# Patient Record
Sex: FEMALE | Race: WHITE | NOT HISPANIC OR LATINO | Employment: UNEMPLOYED | URBAN - NONMETROPOLITAN AREA
[De-identification: names, ages, dates, MRNs, and addresses within clinical notes are randomized per-mention and may not be internally consistent; named-entity substitution may affect disease eponyms.]

---

## 2017-06-03 ENCOUNTER — HOSPITAL ENCOUNTER (EMERGENCY)
Facility: HOSPITAL | Age: 57
Discharge: HOME | End: 2017-06-03
Attending: PHYSICIAN ASSISTANT | Admitting: PHYSICIAN ASSISTANT
Payer: COMMERCIAL

## 2017-06-03 VITALS
HEART RATE: 110 BPM | SYSTOLIC BLOOD PRESSURE: 157 MMHG | DIASTOLIC BLOOD PRESSURE: 84 MMHG | OXYGEN SATURATION: 98 % | TEMPERATURE: 97.8 F | RESPIRATION RATE: 16 BRPM

## 2017-06-03 DIAGNOSIS — J20.9 ACUTE BRONCHITIS, UNSPECIFIED ORGANISM: ICD-10-CM

## 2017-06-03 DIAGNOSIS — R11.0 NAUSEA: ICD-10-CM

## 2017-06-03 PROCEDURE — 99214 OFFICE O/P EST MOD 30 MIN: CPT | Performed by: PHYSICIAN ASSISTANT

## 2017-06-03 PROCEDURE — 99213 OFFICE O/P EST LOW 20 MIN: CPT | Mod: 25

## 2017-06-03 PROCEDURE — 94640 AIRWAY INHALATION TREATMENT: CPT

## 2017-06-03 PROCEDURE — 71020 ZZHC CHEST TWO VIEWS, FRONT/LAT: CPT | Mod: TC

## 2017-06-03 PROCEDURE — 25000125 ZZHC RX 250: Performed by: PHYSICIAN ASSISTANT

## 2017-06-03 RX ORDER — ALBUTEROL SULFATE 0.83 MG/ML
1 SOLUTION RESPIRATORY (INHALATION) EVERY 6 HOURS PRN
Qty: 75 ML | Refills: 0 | Status: SHIPPED | OUTPATIENT
Start: 2017-06-03 | End: 2017-08-19

## 2017-06-03 RX ORDER — ONDANSETRON 4 MG/1
4 TABLET, ORALLY DISINTEGRATING ORAL ONCE
Status: COMPLETED | OUTPATIENT
Start: 2017-06-03 | End: 2017-06-03

## 2017-06-03 RX ORDER — IPRATROPIUM BROMIDE AND ALBUTEROL SULFATE 2.5; .5 MG/3ML; MG/3ML
3 SOLUTION RESPIRATORY (INHALATION) ONCE
Status: COMPLETED | OUTPATIENT
Start: 2017-06-03 | End: 2017-06-03

## 2017-06-03 RX ORDER — ONDANSETRON 4 MG/1
4 TABLET, ORALLY DISINTEGRATING ORAL EVERY 8 HOURS PRN
Qty: 10 TABLET | Refills: 0 | Status: SHIPPED | OUTPATIENT
Start: 2017-06-03 | End: 2017-06-06

## 2017-06-03 RX ORDER — AZITHROMYCIN 250 MG/1
TABLET, FILM COATED ORAL
Qty: 6 TABLET | Refills: 0 | Status: SHIPPED | OUTPATIENT
Start: 2017-06-03 | End: 2017-08-19

## 2017-06-03 RX ORDER — GUAIFENESIN AND PSEUDOEPHEDRINE HCL 1200; 120 MG/1; MG/1
1 TABLET, EXTENDED RELEASE ORAL 2 TIMES DAILY
Qty: 28 TABLET | Refills: 0 | Status: SHIPPED | OUTPATIENT
Start: 2017-06-03 | End: 2017-08-19

## 2017-06-03 RX ORDER — ALBUTEROL SULFATE 90 UG/1
2 AEROSOL, METERED RESPIRATORY (INHALATION) EVERY 4 HOURS PRN
Qty: 1 INHALER | Refills: 0 | Status: SHIPPED | OUTPATIENT
Start: 2017-06-03 | End: 2020-10-03

## 2017-06-03 RX ADMIN — ONDANSETRON 4 MG: 4 TABLET, ORALLY DISINTEGRATING ORAL at 11:06

## 2017-06-03 RX ADMIN — IPRATROPIUM BROMIDE AND ALBUTEROL SULFATE 3 ML: .5; 3 SOLUTION RESPIRATORY (INHALATION) at 10:52

## 2017-06-03 ASSESSMENT — ENCOUNTER SYMPTOMS
NECK STIFFNESS: 0
HEADACHES: 0
CARDIOVASCULAR NEGATIVE: 1
LIGHT-HEADEDNESS: 0
EYE REDNESS: 0
FATIGUE: 1
FEVER: 0
NECK PAIN: 0
PSYCHIATRIC NEGATIVE: 1
VOMITING: 0
SORE THROAT: 0
NAUSEA: 1
TROUBLE SWALLOWING: 0
DIAPHORESIS: 1
DIZZINESS: 0
DIARRHEA: 0
VOICE CHANGE: 0
ABDOMINAL PAIN: 0
COUGH: 1
SINUS PRESSURE: 0
APPETITE CHANGE: 0
EYE DISCHARGE: 0

## 2017-06-03 NOTE — ED AVS SNAPSHOT
[facility]    [address] [address]    Phone:  [phone]                                       Nevin Eckert   MRN: [id number]    Department:  [facility]   Date of Visit:  6/3/2017           Patient Information     Date Of Birth          1960        Your diagnoses for this visit were:     Acute bronchitis, unspecified organism     Nausea        You were seen by Jennifer West PA.      Follow-up Information     Follow up with [facility].    Specialty:  EMERGENCY MEDICINE    Why:  If symptoms worsen or if further concerns develop    Contact information:    [address] [address] [phone]    Additional information:    From [facility]: Take [address]. Turn left at [address]. Turn left at the first stoplight on [address]. At the first stop sign, take a right onto [address]. Take a left into the parking lot and continue through until you reach the North enterance of the building.       From [place]: Take [address]. Take the [address] ramp towards [place]. Turn left onto [address]. Take a slight right onto [address]. Turn left at the first stoplight on [address]. At the first stop sign, take a right onto [address]. Take a left into the parking lot and continue through until you reach the North enterance of the building.       From [place]: Take [address]. Take a right at [address]. At the first stop sign, take a right onto [address]. Take a left into the parking lot and continue through until you reach the North enterance of the building.       Discharge References/Attachments     BRONCHITIS, ANTIOBIOTIC TREATMENT (ADULT) (ENGLISH)    DIET, VOMITING OR DIARRHEA (ADULT) (ENGLISH)         Review of your medicines      START taking        Dose / Directions Last dose taken    albuterol 108 (90 BASE) MCG/ACT Inhaler    Commonly known as:  albuterol   Dose:  2 puff   Quantity:  1 Inhaler        Inhale 2 puffs into the lungs every 4 hours as needed for shortness of breath / dyspnea   Refills:  0        azithromycin 250 MG tablet   Commonly known as:  ZITHROMAX   Quantity:  6 tablet        Take 2 tablets today then one daily for the next 4 days   Refills:  0        ondansetron 4 MG ODT tab   Commonly known as:  ZOFRAN ODT   Dose:  4 mg   Quantity:  10 tablet        Take 1 tablet (4 mg) by mouth every 8 hours as needed for nausea   Refills:  0        PseudoePHEDrine-guaiFENesin 120-1200 MG Tb12   Dose:  1 tablet   Quantity:  28 tablet        Take 1 tablet by mouth 2 times daily   Refills:  0          Our records show that you are taking the medicines listed below. If these are incorrect, please call your family doctor or clinic.        Dose / Directions Last dose taken    * ACCU-CHEK COMPLETE Kit   Dose:  1 Device   Quantity:  60 strip        1 Device 2 times daily   Refills:  12        * blood glucose monitoring meter device kit   Commonly known as:  no brand specified   Quantity:  1 kit        Use to test blood sugar 2 times daily or as directed.   Refills:  0        aspirin 81 MG tablet        Take  by mouth daily.   Refills:  0        blood glucose lancets standard   Commonly known as:  no brand specified   Quantity:  1 Box        Use to test blood sugar 2 times daily or as directed.   Refills:  5        blood glucose monitoring test strip   Commonly known as:  no brand specified   Quantity:  100 each        Use to test blood sugars 2 times daily or as directed   Refills:  5        GLUCOPHAGE XR PO   Dose:  500 mg   Indication:  Type 2 Diabetes        Take 500 mg by mouth daily (with breakfast)   Refills:  0        lisinopril 2.5 MG tablet   Commonly known as:  PRINIVIL/Zestril   Dose:  2.5 mg   Quantity:  30 tablet        Take 1 tablet (2.5 mg) by mouth daily   Refills:  12        oxyCODONE 5 MG IR tablet   Commonly known as:   "ROXICODONE   Dose:  5 mg        Take 5 mg by mouth   Refills:  0        * Notice:  This list has 2 medication(s) that are the same as other medications prescribed for you. Read the directions carefully, and ask your doctor or other care provider to review them with you.            Prescriptions were sent or printed at these locations (4 Prescriptions)                   [facility] - [place], [place] - [address] AT [facility] [address]   [address], [place] [address]    Telephone:  [phone]   Fax:  [phone]   Hours:                  E-Prescribed (3 of 4)         azithromycin (ZITHROMAX) 250 MG tablet               albuterol (ALBUTEROL) 108 (90 BASE) MCG/ACT Inhaler               ondansetron (ZOFRAN ODT) 4 MG ODT tab                 Printed at Department/Unit printer (1 of 4)         PseudoePHEDrine-guaiFENesin 120-1200 MG TB12                Procedures and tests performed during your visit     Chest XR,  PA & LAT      Orders Needing Specimen Collection     None      Pending Results     Date and Time Order Name Status Description    6/3/2017 1022 Chest XR,  PA & LAT In process             Pending Culture Results     No orders found from 6/1/2017 to 6/4/2017.            Thank you for choosing [facility]       Thank you for choosing [facility] for your care. Our goal is always to provide you with excellent care. Hearing back from our patients is one way we can continue to improve our services. Please take a few minutes to complete the written survey that you may receive in the mail after you visit with us. Thank you!        [facility] Information     [facility] lets you send messages to your doctor, view your test results, renew your prescriptions, schedule appointments and more. To sign up, go to [email or web address] . Click on \"Log in\" on the left side of the screen, which will take you to the Welcome page. Then click on \"Sign up Now\" on the right side of the page.     You will be asked to " enter the access code listed below, as well as some personal information. Please follow the directions to create your username and password.     Your access code is: [email or web address]  Expires: 2017 10:57 AM     Your access code will  in 90 days. If you need help or a new code, please call your [place] clinic or [phone].        Care EveryWhere ID     This is your Care EveryWhere ID. This could be used by other organizations to access your [facility] medical records  [id number]        After Visit Summary       This is your record. Keep this with you and show to your community pharmacist(s) and doctor(s) at your next visit.

## 2017-06-03 NOTE — ED AVS SNAPSHOT
[facility]    [address] [address]    Phone:  [phone]                                       Nevin Eckert   MRN: [id number]    Department:  [facility]   Date of Visit:  6/3/2017           After Visit Summary Signature Page     I have received my discharge instructions, and my questions have been answered. I have discussed any challenges I see with this plan with the nurse or doctor.    ..........................................................................................................................................  Patient/Patient Representative Signature      ..........................................................................................................................................  Patient Representative Print Name and Relationship to Patient    ..................................................               ................................................  Date                                            Time    ..........................................................................................................................................  Reviewed by Signature/Title    ...................................................              ..............................................  Date                                                            Time

## 2017-06-03 NOTE — ED NOTES
Ambulated into UC in rm 3. C/O pt states symptoms started about a week ago and has progressively getting worse with the cough and choking on the yellow phelm. She is feverish and when she takes tylenol she gets pain in her pancrease, pain rating a 5. Excertion makes the condition worse.

## 2017-06-03 NOTE — ED PROVIDER NOTES
History     Chief Complaint   Patient presents with     Cough     states shes had for a week and coughing up yellow sputum.  states her family has all had the same thing and dx with viral but thinks she needs antibiotics because of the yellow sputum     Generalized Body Aches     shes had for a week also very tired for a week     The history is provided by the patient. No  was used.     Nevin Eckert is a 57 year old female who has almost 2 weeks of cough/congestion, fatigue, and nausea. Pt sweaty.  Has diffuse body aches    Allergies as of 06/03/2017 - [place] as Reviewed 06/03/2017   Allergen Reaction Noted     Codeine Other (See Comments) and Itching 05/03/2013     Morphine Other (See Comments) 05/03/2013     Penicillins Other (See Comments) and Rash 05/03/2013     Discharge Medication List as of 6/3/2017 10:58 AM      START taking these medications    Details   azithromycin (ZITHROMAX) 250 MG tablet Take 2 tablets today then one daily for the next 4 days, Disp-6 tablet, R-0, E-Prescribe      PseudoePHEDrine-guaiFENesin 120-1200 MG TB12 Take 1 tablet by mouth 2 times daily, Disp-28 tablet, R-0, Local Print      albuterol (ALBUTEROL) 108 (90 BASE) MCG/ACT Inhaler Inhale 2 puffs into the lungs every 4 hours as needed for shortness of breath / dyspnea, Disp-1 Inhaler, R-0, E-Prescribe      ondansetron (ZOFRAN ODT) 4 MG ODT tab Take 1 tablet (4 mg) by mouth every 8 hours as needed for nausea, Disp-10 tablet, R-0, E-Prescribe         CONTINUE these medications which have NOT CHANGED    Details   blood glucose monitoring (NO BRAND SPECIFIED) meter device kit Use to test blood sugar 2 times daily or as directed.Disp-1 kit, [id number] dispense Accu-Check      blood glucose monitoring (NO BRAND SPECIFIED) test strip Use to test blood sugars 2 times daily or as directedDisp-100 each, [id number] dispense Accu-Check      blood glucose (NO BRAND SPECIFIED) lancets standard Use to test  blood sugar 2 times daily or as directed.Disp-1 Box, [id number] dispense Accu-Check.      MetFORMIN HCl (GLUCOPHAGE XR PO) Take 500 mg by mouth daily (with breakfast), Historical      Blood Glucose Monitoring Suppl (ACCU-CHEK COMPLETE) KIT 1 Device 2 times daily, Disp-60 strip, R-12, E-Prescribe      lisinopril (PRINIVIL,ZESTRIL) 2.5 MG tablet Take 1 tablet (2.5 mg) by mouth daily, Disp-30 tablet, R-12, E-Prescribe      aspirin 81 MG tablet Take  by mouth daily., Historical      oxyCODONE (ROXICODONE) 5 MG immediate release tablet Take 5 mg by mouth, Historical           Past Medical History:   Diagnosis Date     Absence of menstruation 1/15/2001     Acute sinusitis, unspecified 1/3/2004     Acute upper respiratory infections of unspecified site 11/17/2000     Bronchitis, not specified as acute or chronic 11/20/2000     Care involving other physical therapy 4/17/2000     Chest pain, unspecified 10/12/2000     Diabetes mellitus without mention of complication,  2/4/2003     Migraine, unspecified, without mention of intractable migraine without mention of status migrainosus 12/5/2002     Seronegative rheumatoid arthritis (H) 02/24/2016    Dr Hooker     Unspecified sinusitis (chronic) 3/28/2000     Past Surgical History:   Procedure Laterality Date     Carpal tunnel syndrome      carpal tunnel release; RT     cholecystectomy       L3-L4 discectomy       Family History   Problem Relation Age of Onset     C.A.D. Father      Other - See Comments Brother      Hyperlipidemia     Other - See Comments Mother      Hyperlipidemia/Lupus erythematosus/Osteoarthritis, with lumbar stenosis     Social History     Social History     Marital status: Single     Spouse name: N/A     Number of children: N/A     Years of education: N/A     Social History Main Topics     Smoking status: Former Smoker     Packs/day: 1.00     Years: 30.00     Types: Cigarettes     Quit date: 10/26/2015     Smokeless tobacco: Never Used       Comment: Tried to quit; longest tobacco free, 3 years; no passive exposure     Alcohol use Yes      Comment: Rarely     Drug use: No     Sexual activity: Not Asked     Other Topics Concern     Caffeine Concern Yes     Coffee, 4 cups daily     Sleep Concern Yes     decrease in sleep due to pain     Stress Concern Yes     due to the pain and not knowing what is wrong     Parent/Sibling W/ Cabg, Mi Or Angioplasty Before 65f 55m? Yes     Dad had heart attack before age 55.     Social History Narrative         I have reviewed the Medications, Allergies, Past Medical and Surgical History, and Social History in the Epic system.    Review of Systems   Constitutional: Positive for diaphoresis and fatigue. Negative for appetite change and fever.   HENT: Positive for congestion. Negative for ear pain, sinus pressure, sore throat, trouble swallowing and voice change.    Eyes: Negative for discharge and redness.   Respiratory: Positive for cough.    Cardiovascular: Negative.    Gastrointestinal: Positive for nausea. Negative for abdominal pain, diarrhea and vomiting.   Genitourinary: Negative.    Musculoskeletal: Negative for neck pain and neck stiffness.   Skin: Negative for rash.   Neurological: Negative for dizziness, light-headedness and headaches.   Psychiatric/Behavioral: Negative.        Physical Exam   BP: 141/85  Pulse: 100  Temp: 98  F (36.7  C)  Resp: 16  SpO2: 93 %  Physical Exam   Constitutional: She is oriented to person, place, and time. She appears well-developed and well-nourished. No distress.   HENT:   Head: Normocephalic and atraumatic.   Right Ear: External ear normal.   Left Ear: External ear normal.   Mouth/Throat: Oropharynx is clear and moist.   Bilateral TMs/canals clear/wnl  No sinus TTP     Eyes: Conjunctivae and EOM are normal. Right eye exhibits no discharge. Left eye exhibits no discharge.   Neck: Normal range of motion. Neck supple.   Cardiovascular: Regular rhythm and normal heart sounds.     tachycardia   Pulmonary/Chest: Effort normal and breath sounds normal. No respiratory distress.   Abdominal: Soft. Bowel sounds are normal. She exhibits no distension. There is no tenderness.   Neurological: She is alert and oriented to person, place, and time.   Skin: Skin is warm and dry. No rash noted. She is not diaphoretic.   Psychiatric: She has a normal mood and affect.   Nursing note and vitals reviewed.      ED Course     ED Course     Procedures      CXR: LLL early infiltrate,  Pending official rad results    Medications   ipratropium - albuterol 0.5 mg/2.5 mg/3 mL (DUONEB) neb solution 3 mL (3 mLs Nebulization Given 6/3/17 1052)   ondansetron (ZOFRAN-ODT) ODT tab 4 mg (4 mg Oral Given 6/3/17 1106)   pt tolerated well.    Assessments & Plan (with Medical Decision Making)     I have reviewed the nursing notes.    I have reviewed the findings, diagnosis, plan and need for follow up with the patient.    Discharge Medication List as of 6/3/2017 10:58 AM      START taking these medications    Details   azithromycin (ZITHROMAX) 250 MG tablet Take 2 tablets today then one daily for the next 4 days, Disp-6 tablet, R-0, E-Prescribe      PseudoePHEDrine-guaiFENesin 120-1200 MG TB12 Take 1 tablet by mouth 2 times daily, Disp-28 tablet, R-0, Local Print      albuterol (ALBUTEROL) 108 (90 BASE) MCG/ACT Inhaler Inhale 2 puffs into the lungs every 4 hours as needed for shortness of breath / dyspnea, Disp-1 Inhaler, R-0, E-Prescribe      ondansetron (ZOFRAN ODT) 4 MG ODT tab Take 1 tablet (4 mg) by mouth every 8 hours as needed for nausea, Disp-10 tablet, R-0, E-Prescribe             Final diagnoses:   Acute bronchitis, unspecified organism   Nausea           Patient verbally educated and given appropriate education sheets for each of the diagnoses and has no questions.  Take OTC motrin or tylenol as directed on the bottle as needed.  Take prescription medications as directed.  Increase fluids, wash hands often.  Sleep  in a recliner or with multiple pillows until this has resolved.   if symptoms increase or if concerns develop, return to the ER.  Jennifer West Certified   Physician Assistant  6/3/2017  3:34 PM  [facility]ARE CLINIC    6/3/2017   [facility]     Jennifer West PA  06/03/17 1534

## 2017-06-10 NOTE — PROGRESS NOTES
Chest XR report routed to PCP, Dr. JAMES Woodard.  Impression - left basilar atelectasis and possible subtle pneumonia.  Pt was seen in UC on 6/3/17, advised to follow up with PCP as needed.

## 2017-08-19 ENCOUNTER — HOSPITAL ENCOUNTER (EMERGENCY)
Facility: HOSPITAL | Age: 57
Discharge: HOME | End: 2017-08-19
Attending: NURSE PRACTITIONER | Admitting: NURSE PRACTITIONER
Payer: COMMERCIAL

## 2017-08-19 VITALS
OXYGEN SATURATION: 97 % | SYSTOLIC BLOOD PRESSURE: 136 MMHG | RESPIRATION RATE: 16 BRPM | DIASTOLIC BLOOD PRESSURE: 57 MMHG | TEMPERATURE: 97.8 F

## 2017-08-19 DIAGNOSIS — S29.011A INTERCOSTAL MUSCLE STRAIN, INITIAL ENCOUNTER: ICD-10-CM

## 2017-08-19 DIAGNOSIS — J20.8 ACUTE BRONCHITIS DUE TO OTHER SPECIFIED ORGANISMS: ICD-10-CM

## 2017-08-19 PROCEDURE — 71020 ZZHC CHEST TWO VIEWS, FRONT/LAT: CPT | Mod: TC

## 2017-08-19 PROCEDURE — 99213 OFFICE O/P EST LOW 20 MIN: CPT | Mod: 25

## 2017-08-19 PROCEDURE — 99213 OFFICE O/P EST LOW 20 MIN: CPT | Performed by: PHYSICIAN ASSISTANT

## 2017-08-19 RX ORDER — ALBUTEROL SULFATE 0.83 MG/ML
1 SOLUTION RESPIRATORY (INHALATION) EVERY 4 HOURS PRN
Qty: 1 BOX | Refills: 0 | Status: SHIPPED | OUTPATIENT
Start: 2017-08-19 | End: 2017-09-18

## 2017-08-19 RX ORDER — AZITHROMYCIN 250 MG/1
TABLET, FILM COATED ORAL
Qty: 6 TABLET | Refills: 0 | Status: SHIPPED | OUTPATIENT
Start: 2017-08-19 | End: 2020-10-03

## 2017-08-19 RX ORDER — GUAIFENESIN AND PSEUDOEPHEDRINE HCL 1200; 120 MG/1; MG/1
1 TABLET, EXTENDED RELEASE ORAL 2 TIMES DAILY
Qty: 28 TABLET | Refills: 0 | Status: SHIPPED | OUTPATIENT
Start: 2017-08-19

## 2017-08-19 ASSESSMENT — ENCOUNTER SYMPTOMS
NECK STIFFNESS: 0
SORE THROAT: 0
FATIGUE: 1
ABDOMINAL PAIN: 0
LIGHT-HEADEDNESS: 0
TROUBLE SWALLOWING: 0
COUGH: 1
NECK PAIN: 0
DIARRHEA: 0
HEADACHES: 0
EYE DISCHARGE: 0
VOMITING: 0
FEVER: 0
NAUSEA: 0
EYE REDNESS: 0
APPETITE CHANGE: 0
SINUS PRESSURE: 0
PSYCHIATRIC NEGATIVE: 1
VOICE CHANGE: 0
DIZZINESS: 0

## 2017-08-19 NOTE — ED PROVIDER NOTES
History     Chief Complaint   Patient presents with     URI     c/o productive cough, notes rt chest pain with cough and breathing     The history is provided by the patient. No  was used.     Nevin Eckert is a 57 year old female who has cough for over one month. Now also has right side of chest pain, worse with coughing and when she pushes on the area. No n/v/d/f/c. No sore throat, no ear pain and no sinus pain/pressure    I have reviewed the Medications, Allergies, Past Medical and Surgical History, and Social History in the Epic system.    Allergies:   Allergies   Allergen Reactions     Codeine Other (See Comments) and Itching     SOB     Morphine Other (See Comments)     Low heart rate     Penicillins Other (See Comments) and Rash     Swelling         No current facility-administered medications on file prior to encounter.   Current Outpatient Prescriptions on File Prior to Encounter:  albuterol (ALBUTEROL) 108 (90 BASE) MCG/ACT Inhaler Inhale 2 puffs into the lungs every 4 hours as needed for shortness of breath / dyspnea   MetFORMIN HCl (GLUCOPHAGE XR PO) Take 250 mg by mouth daily (with breakfast)    lisinopril (PRINIVIL,ZESTRIL) 2.5 MG tablet Take 1 tablet (2.5 mg) by mouth daily   aspirin 81 MG tablet Take  by mouth daily.   [DISCONTINUED] albuterol (2.5 MG/3ML) 0.083% neb solution Take 1 vial (2.5 mg) by nebulization every 6 hours as needed for shortness of breath / dyspnea or wheezing   blood glucose monitoring (NO BRAND SPECIFIED) meter device kit Use to test blood sugar 2 times daily or as directed.   blood glucose monitoring (NO BRAND SPECIFIED) test strip Use to test blood sugars 2 times daily or as directed   blood glucose (NO BRAND SPECIFIED) lancets standard Use to test blood sugar 2 times daily or as directed.   Blood Glucose Monitoring Suppl (ACCU-CHEK COMPLETE) KIT 1 Device 2 times daily       Patient Active Problem List   Diagnosis     Type 2 diabetes, HbA1C goal < 8% (H)  "    Hypertension goal BP (blood pressure) < 130/80     Hyperlipidemia LDL goal <100     Tobacco use disorder       Past Surgical History:   Procedure Laterality Date     Carpal tunnel syndrome      carpal tunnel release; RT     cholecystectomy       L3-L4 discectomy         Social History   Substance Use Topics     Smoking status: Former Smoker     Packs/day: 1.00     Years: 30.00     Types: Cigarettes     Quit date: 10/26/2015     Smokeless tobacco: Never Used      Comment: Tried to quit; longest tobacco free, 3 years; no passive exposure     Alcohol use No       Most Recent Immunizations   Administered Date(s) Administered     Pneumococcal 23 valent 07/02/2015     TD (ADULT, 7+) 02/22/2000     TDAP Vaccine (Boostrix) 07/02/2015       BMI: Estimated body mass index is 36.96 kg/(m^2) as calculated from the following:    Height as of 2/9/16: 1.702 m (5' 7\").    Weight as of 2/9/16: 107 kg (236 lb).      Review of Systems   Constitutional: Positive for fatigue. Negative for appetite change and fever.   HENT: Positive for congestion. Negative for ear pain, sinus pressure, sore throat, trouble swallowing and voice change.    Eyes: Negative for discharge and redness.   Respiratory: Positive for cough.    Cardiovascular: Positive for chest pain (right side, when she coughs or takes deep breath).   Gastrointestinal: Negative for abdominal pain, diarrhea, nausea and vomiting.   Genitourinary: Negative.    Musculoskeletal: Negative for neck pain and neck stiffness.   Skin: Negative for rash.   Neurological: Negative for dizziness, light-headedness and headaches.   Psychiatric/Behavioral: Negative.        Physical Exam   BP: 136/57  Heart Rate: 103  Temp: 97.8  F (36.6  C)  Resp: 16  SpO2: 97 %  Physical Exam   Constitutional: She is oriented to person, place, and time. She appears well-developed and well-nourished. No distress.   HENT:   Head: Normocephalic and atraumatic.   Right Ear: External ear normal.   Left Ear: " External ear normal.   Mouth/Throat: Oropharynx is clear and moist.   Bilateral TMs/canals clear/wnl  No sinus TTP     Eyes: Conjunctivae and EOM are normal. Right eye exhibits no discharge. Left eye exhibits no discharge.   Neck: Normal range of motion. Neck supple.   Cardiovascular: Regular rhythm and normal heart sounds.    tachycardia   Pulmonary/Chest: Effort normal. No respiratory distress.   BLL mild rhonci   Abdominal: Soft. Bowel sounds are normal. She exhibits no distension. There is no tenderness.   Neurological: She is alert and oriented to person, place, and time.   Skin: Skin is warm and dry. No rash noted. She is not diaphoretic.   Psychiatric: She has a normal mood and affect.   Nursing note and vitals reviewed.      ED Course     ED Course     Procedures          Assessments & Plan (with Medical Decision Making)     I have reviewed the nursing notes.    I have reviewed the findings, diagnosis, plan and need for follow up with the patient.    Discharge Medication List as of 8/19/2017  1:04 PM      START taking these medications    Details   albuterol (2.5 MG/3ML) 0.083% neb solution Take 1 vial (2.5 mg) by nebulization every 4 hours as needed for shortness of breath / dyspnea, Disp-1 Box, R-0, E-Prescribe      azithromycin (ZITHROMAX) 250 MG tablet Take 2 tablets today then one daily for the next 4 days, Disp-6 tablet, R-0, E-Prescribe      PseudoePHEDrine-guaiFENesin 120-1200 MG TB12 Take 1 tablet by mouth 2 times daily, Disp-28 tablet, R-0, Local Print             Final diagnoses:   Acute bronchitis due to other specified organisms   Intercostal muscle strain, initial encounter         Pt will stop her steroids as directed by your RA specialist, while she is on the antibiotic.    Patient verbally educated and given appropriate education sheets for each of the diagnoses and has no questions.  Take OTC  tylenol as directed on the bottle as needed.  Take prescription medications as directed.  Increase  fluids, wash hands often.  Sleep in a recliner or with multiple pillows until this has resolved.  Follow up with your provider if symptoms increase or if concerns develop, return to the ER.  Jennifer West Certified   Physician Assistant  8/19/2017  1:29 PM  [facility]    8/19/2017   [facility]     Jennifer West PA  08/19/17 1330       Jennifer West PA  08/19/17 1331

## 2017-08-19 NOTE — ED NOTES
Discharge teaching done including when to seek medical attention and how to take her antibiotics and the need to complete prescription. Pt discharged to home, states she will  her medication on the way home.

## 2017-08-19 NOTE — ED AVS SNAPSHOT
[facility]    [address] [address]    Phone:  [phone]                                       Nevin Eckert   MRN: [id number]    Department:  [facility]   Date of Visit:  8/19/2017           Patient Information     Date Of Birth          1960        Your diagnoses for this visit were:     Acute bronchitis due to other specified organisms     Intercostal muscle strain, initial encounter        You were seen by Guerita Ramos NP and Jennifer West PA.      Follow-up Information     Follow up with Waldo Woodard DO.    Specialty:  Family Practice    Why:  If symptoms worsen    Contact information:    [facility]  [address] [facility] [address] [phone]          Follow up with [facility].    Specialty:  EMERGENCY MEDICINE    Why:  If further concerns develop over the weekend    Contact information:    [address] [address] [phone]    Additional information:    From [facility]: Take [address]. Turn left at [address]. Turn left at the first stoplight on [address] [place] [address]. At the first stop sign, take a right onto [address]. Take a left into the parking lot and continue through until you reach the North enterance of the building.       From [place]: Take [address]. Take the [address] ramp towards [place]. Turn left onto [address]. Take a slight right onto [address]. Turn left at the first stoplight on [address]. At the first stop sign, take a right onto [address]. Take a left into the parking lot and continue through until you reach the North enterance of the building.       From [place]: Take [address]. Take a right at [address]. At the first stop sign, take a right onto [address]. Take a left into the parking lot and continue through until you reach the North enterance of the building.        Discharge References/Attachments     BRONCHITIS, ANTIOBIOTIC TREATMENT (ADULT) (ENGLISH)    BRONCHITIS WITH WHEEZING (ADULT) (ENGLISH)         Review of your medicines      START taking        Dose / Directions Last dose taken    azithromycin 250 MG tablet   Commonly known as:  ZITHROMAX   Quantity:  6 tablet        Take 2 tablets today then one daily for the next 4 days   Refills:  0        PseudoePHEDrine-guaiFENesin 120-1200 MG Tb12   Dose:  1 tablet   Quantity:  28 tablet        Take 1 tablet by mouth 2 times daily   Refills:  0          Our records show that you are taking the medicines listed below. If these are incorrect, please call your family doctor or clinic.        Dose / Directions Last dose taken    * ACCU-CHEK COMPLETE Kit   Dose:  1 Device   Quantity:  60 strip        1 Device 2 times daily   Refills:  12        * blood glucose monitoring meter device kit   Commonly known as:  no brand specified   Quantity:  1 kit        Use to test blood sugar 2 times daily or as directed.   Refills:  0        aspirin 81 MG tablet        Take  by mouth daily.   Refills:  0        blood glucose lancets standard   Commonly known as:  no brand specified   Quantity:  1 Box        Use to test blood sugar 2 times daily or as directed.   Refills:  5        blood glucose monitoring test strip   Commonly known as:  no brand specified   Quantity:  100 each        Use to test blood sugars 2 times daily or as directed   Refills:  5        etanercept 50 MG/ML injection   Commonly known as:  ENBREL   Dose:  50 mg        Inject 50 mg Subcutaneous once a week   Refills:  0        FOLIC ACID PO   Dose:  1 mg        Take 1 mg by mouth daily   Refills:  0        GLUCOPHAGE XR PO   Dose:  250 mg   Indication:  Type 2 Diabetes        Take 250 mg by mouth daily (with breakfast)   Refills:  0        lisinopril 2.5 MG tablet   Commonly known as:  PRINIVIL/Zestril   Dose:  2.5 mg   Quantity:  30 tablet        Take 1 tablet (2.5 mg) by  mouth daily   Refills:  12        METHOTREXATE PO   Dose:  20 mg        Take 20 mg by mouth   Refills:  0        PREDNISONE PO   Dose:  5 mg        Take 5 mg by mouth daily   Refills:  0        * Notice:  This list has 2 medication(s) that are the same as other medications prescribed for you. Read the directions carefully, and ask your doctor or other care provider to review them with you.      ASK your doctor about these medications        Dose / Directions Last dose taken    * albuterol 108 (90 BASE) MCG/ACT Inhaler   Commonly known as:  albuterol   Dose:  2 puff   What changed:  Another medication with the same name was added. Make sure you understand how and when to take each.   Quantity:  1 Inhaler   Ask about: Which instructions should I use?        Inhale 2 puffs into the lungs every 4 hours as needed for shortness of breath / dyspnea   Refills:  0        * albuterol (2.5 MG/3ML) 0.083% neb solution   Dose:  1 vial   What changed:  You were already taking a medication with the same name, and this prescription was added. Make sure you understand how and when to take each.   Quantity:  1 Box   Ask about: Which instructions should I use?        Take 1 vial (2.5 mg) by nebulization every 4 hours as needed for shortness of breath / dyspnea   Refills:  0        * Notice:  This list has 2 medication(s) that are the same as other medications prescribed for you. Read the directions carefully, and ask your doctor or other care provider to review them with you.            Prescriptions were sent or printed at these locations (3 Prescriptions)                   [facility] [address] [place] [address] - [address] AT [facility] [address] [place] [address]    Telephone:  [phone]   Fax:  [phone]   Hours:                  E-Prescribed (2 of 3)         albuterol (2.5 MG/3ML) 0.083% neb solution               azithromycin (ZITHROMAX) 250 MG tablet                 Printed at Department/Unit  "printer (1 of 3)         PseudoePHEDrine-guaiFENesin 120-1200 MG TB12                Procedures and tests performed during your visit     Chest XR,  PA & LAT      Orders Needing Specimen Collection     None      Pending Results     Date and Time Order Name Status Description    2017 1239 Chest XR,  PA & LAT In process             Pending Culture Results     No orders found from 2017 to 2017.            Thank you for choosing [facility]       Thank you for choosing [facility] for your care. Our goal is always to provide you with excellent care. Hearing back from our patients is one way we can continue to improve our services. Please take a few minutes to complete the written survey that you may receive in the mail after you visit with us. Thank you!        [employer] Information     [employer] lets you send messages to your doctor, view your test results, renew your prescriptions, schedule appointments and more. To sign up, go to [email or web address] . Click on \"Log in\" on the left side of the screen, which will take you to the Welcome page. Then click on \"Sign up Now\" on the right side of the page.     You will be asked to enter the access code listed below, as well as some personal information. Please follow the directions to create your username and password.     Your access code is: [email or web address]  Expires: 2017 10:57 AM     Your access code will  in 90 days. If you need help or a new code, please call your [facility] clinic or [phone].        Care EveryWhere ID     This is your Care EveryWhere ID. This could be used by other organizations to access your [facility] medical records  [id number]        Equal Access to Services     [facility]: Hadii mynor [place]ola, waaxda luqadaha, qaybta kaalkatelin mccormick. So [facility] [phone].    ATENCIÓN: Si habla español, tiene a stevens disposición servicios gratuitos de asistencia lingüística. Llame al " [phone].    We comply with applicable federal civil rights laws and [place] laws. We do not discriminate on the basis of race, color, national origin, age, disability sex, sexual orientation or gender identity.            After Visit Summary       This is your record. Keep this with you and show to your community pharmacist(s) and doctor(s) at your next visit.

## 2017-08-19 NOTE — ED NOTES
"Pt states that 5 to 6 weeks ago she started coughing up clear to white secretions. Pt admits that she had been coughing hard enough she was vomiting from it, running fevers and chills, SOB but did not seek any medical attention, \"I had everything I needed, I was using my nebulizer and I kept thinking it would go away.\" then 3 days ago Rt \"lung\" anterior and lateral portion started having sharp pain. Coughing makes pain worse, hot shower makes pain less. Pt still with cough and is unable to sleep due to the coughing. Rates pain 7/10, no OTC medications for this today. Pt states she has had pneumonia in the past and it feels like she has it again.   "

## 2017-08-19 NOTE — ED AVS SNAPSHOT
[facility]    [address] [address]    Phone:  [phone]                                       Nevin Eckert   MRN: [id number]    Department[facility]  [facility]   Date of Visit:  8/19/2017           After Visit Summary Signature Page     I have received my discharge instructions, and my questions have been answered. I have discussed any challenges I see with this plan with the nurse or doctor.    ..........................................................................................................................................  Patient/Patient Representative Signature      ..........................................................................................................................................  Patient Representative Print Name and Relationship to Patient    ..................................................               ................................................  Date                                            Time    ..........................................................................................................................................  Reviewed by Signature/Title    ...................................................              ..............................................  Date                                                            Time

## 2018-11-06 ENCOUNTER — HOSPITAL ENCOUNTER (OUTPATIENT)
Facility: HOSPITAL | Age: 58
End: 2018-11-06
Attending: INTERNAL MEDICINE | Admitting: INTERNAL MEDICINE

## 2020-10-03 ENCOUNTER — HOSPITAL ENCOUNTER (EMERGENCY)
Facility: HOSPITAL | Age: 60
Discharge: HOME | End: 2020-10-03
Attending: PHYSICIAN ASSISTANT | Admitting: PHYSICIAN ASSISTANT
Payer: COMMERCIAL

## 2020-10-03 VITALS
TEMPERATURE: 97.8 F | HEART RATE: 106 BPM | DIASTOLIC BLOOD PRESSURE: 92 MMHG | OXYGEN SATURATION: 96 % | SYSTOLIC BLOOD PRESSURE: 159 MMHG | RESPIRATION RATE: 18 BRPM

## 2020-10-03 DIAGNOSIS — Z20.828 EXPOSURE TO SARS-ASSOCIATED CORONAVIRUS: ICD-10-CM

## 2020-10-03 DIAGNOSIS — J06.9 UPPER RESPIRATORY TRACT INFECTION, UNSPECIFIED TYPE: Primary | ICD-10-CM

## 2020-10-03 PROCEDURE — U0003 INFECTIOUS AGENT DETECTION BY NUCLEIC ACID (DNA OR RNA); SEVERE ACUTE RESPIRATORY SYNDROME CORONAVIRUS 2 (SARS-COV-2) (CORONAVIRUS DISEASE [COVID-19]), AMPLIFIED PROBE TECHNIQUE, MAKING USE OF HIGH THROUGHPUT TECHNOLOGIES AS DESCRIBED BY CMS-2020-01-R: HCPCS | Performed by: PHYSICIAN ASSISTANT

## 2020-10-03 PROCEDURE — G0463 HOSPITAL OUTPT CLINIC VISIT: HCPCS

## 2020-10-03 PROCEDURE — 99213 OFFICE O/P EST LOW 20 MIN: CPT | Performed by: PHYSICIAN ASSISTANT

## 2020-10-03 RX ORDER — AZITHROMYCIN 250 MG/1
TABLET, FILM COATED ORAL
Qty: 6 TABLET | Refills: 0 | Status: SHIPPED | OUTPATIENT
Start: 2020-10-03 | End: 2020-10-08

## 2020-10-03 RX ORDER — ALBUTEROL SULFATE 90 UG/1
2 AEROSOL, METERED RESPIRATORY (INHALATION) EVERY 6 HOURS PRN
Qty: 8.5 G | Refills: 0 | Status: SHIPPED | OUTPATIENT
Start: 2020-10-03

## 2020-10-03 ASSESSMENT — ENCOUNTER SYMPTOMS
SORE THROAT: 0
SHORTNESS OF BREATH: 1
FATIGUE: 0
COUGH: 1
HEADACHES: 1
NECK STIFFNESS: 0
FACIAL ASYMMETRY: 0
CHILLS: 0
DIZZINESS: 0
LIGHT-HEADEDNESS: 0
NECK PAIN: 0
FEVER: 1
NAUSEA: 1
MYALGIAS: 1
VOMITING: 0
SINUS PRESSURE: 0
SINUS PAIN: 0
DIARRHEA: 0
CONSTIPATION: 0

## 2020-10-03 NOTE — DISCHARGE INSTRUCTIONS
Thank you for allowing the urgent care to participate in your care. Please refer to your AVS for follow up and pain/symptoms management recommendations (I.e.: medications, helpful conservative treatment modalities, appropriate follow up if need to a specialist or family practice, etc.). Please return to urgent care if your symptoms change or worsen.     Discharge instructions:  -Follow up with PCP in 3-5 days  -If you were prescribed a medication(s), please take this as prescribed/directed  -Monitor your symptoms, if changing/worsening, return to UC/ER or PCP for follow up    Symptomatic treatments recommended.  -Discussed that antibiotics would not help symptoms of viral URI. Education provided on symptoms of secondary bacterial infection such as new fever, chills, rigors, shortness of breath, increased work of breathing, that can occur with viral URI and need for further evaluation, if they occur.   - Ensure you are staying hydrated by drinking plenty of fluids and eating mild foods and advance diet as tolerated  - Honey can be soothing for sore throat (as long as aboe 12 months of age)  - Warm salt water gurgles can help soothe sore throat  - Humidifier can help with congestion and help keep mucus membranes such as throat and nose from drying out.  - Sleeping slightly propped up can help with congestion and postnasal drainage that can worsen cough at bedtime.  - As long as you have never been told to take Tylenol and/or Ibuprofen you can use them to manage fever and body aches per package instructions  Make sure you eat when you take ibuprofen to avoid stomach upset.  - OTC cough medications per package instructions to help with cough. Check to see if the cough/cold medication already has acetaminophen (Tylenol) in it. If it does avoid taking additional Tylenol.  - If sudden onset of new fever, worsening symptoms return for further evaluation.  - OTC antihistamine such as Allegra, Zyrtec, Claritin (generic is  okay) can help with nasal/sinus congestion and OTC nasal steroid such as Flonase can help decrease sinus inflammation to help with congestion.  - Education provided on symptoms of post-viral bacterial infections including ear infection and pneumonia. This would require re-evaluation for treatment.

## 2020-10-03 NOTE — ED TRIAGE NOTES
Pt here with c/o cough x3 days. States intermittent fevers at home, afebrile currently. States she has thick white productive sputum at times.

## 2020-10-03 NOTE — ED AVS SNAPSHOT
[facility]  [address] [address]  Phone: [phone]                                    Nevin Eckert   MRN: [id number]    Department[facility]   Date of Visit: 10/3/2020           After Visit Summary Signature Page    I have received my discharge instructions, and my questions have been answered. I have discussed any challenges I see with this plan with the nurse or doctor.    ..........................................................................................................................................  Patient/Patient Representative Signature      ..........................................................................................................................................  Patient Representative Print Name and Relationship to Patient    ..................................................               ................................................  Date                                   Time    ..........................................................................................................................................  Reviewed by Signature/Title    ...................................................              ..............................................  Date                                               Time          22EPIC Rev 08/18

## 2020-10-03 NOTE — ED TRIAGE NOTES
Pt presents with her  and has had a cough for 7 days along with a fever that started yesterday, nausea, headache.

## 2020-10-03 NOTE — ED PROVIDER NOTES
History     Chief Complaint   Patient presents with     Cough     HPI  Nevin Eckert is a 60 year old female who comes to urgent care today with her spouse for a chief complaint of cough x7 days duration.  She had a fever that began yesterday, along with nausea and a headache. Left sided ear pain 3-days duration. History of RA and long term cough states that this feels different than this.  She is coughing up phlegm which is white in color.  No changes in weight.  No changes in appetite, bowel or bladder habits.  No pharyngitis or sinus symptoms.  She states this feels similar to her typical upper respiratory tract infection symptoms/flareups.  She has tried steamy shower and Tylenol with decent relief.  She has no known exposures to COVID.  No additional symptoms to report.    No changes to her allergy medication list.    Allergies:  Allergies   Allergen Reactions     Codeine Other (See Comments) and Itching     SOB     Morphine Other (See Comments)     Low heart rate     Penicillins Other (See Comments) and Rash     Swelling       Problem List:    Patient Active Problem List    Diagnosis Date Noted     Tobacco use disorder 06/18/2014     Priority: Medium     Type 2 diabetes, HbA1C goal < 8% (H) 05/13/2013     Priority: Medium     Hypertension goal BP (blood pressure) < 130/80 05/13/2013     Priority: Medium     Hyperlipidemia LDL goal <100 05/13/2013     Priority: Medium        Past Medical History:    Past Medical History:   Diagnosis Date     Absence of menstruation 1/15/2001     Acute sinusitis, unspecified 1/3/2004     Acute upper respiratory infections of unspecified site 11/17/2000     Bronchitis, not specified as acute or chronic 11/20/2000     Care involving other physical therapy 4/17/2000     Chest pain, unspecified 10/12/2000     Diabetes mellitus without mention of complication,  2/4/2003     Migraine, unspecified, without mention of intractable migraine without mention of status migrainosus  2002     Seronegative rheumatoid arthritis (H) 2016     Unspecified sinusitis (chronic) 3/28/2000       Past Surgical History:    Past Surgical History:   Procedure Laterality Date     Carpal tunnel syndrome      carpal tunnel release; RT     cholecystectomy       L3-L4 discectomy         Family History:    Family History   Problem Relation Age of Onset     C.A.D. Father      Other - See Comments Brother         Hyperlipidemia     Other - See Comments Mother         Hyperlipidemia/Lupus erythematosus/Osteoarthritis, with lumbar stenosis       Social History:  Marital Status:  Single [1]  Social History     Tobacco Use     Smoking status: Former Smoker     Packs/day: 1.00     Years: 30.00     Pack years: 30.00     Types: Cigarettes     Quit date: 10/26/2015     Years since quittin.9     Smokeless tobacco: Never Used     Tobacco comment: Tried to quit; longest tobacco free, 3 years; no passive exposure   Substance Use Topics     Alcohol use: No     Drug use: No        Medications:         albuterol (PROAIR HFA/PROVENTIL HFA/VENTOLIN HFA) 108 (90 Base) MCG/ACT inhaler       aspirin 81 MG tablet       azithromycin (ZITHROMAX Z-CEDRICK) 250 MG tablet       blood glucose (NO BRAND SPECIFIED) lancets standard       blood glucose monitoring (NO BRAND SPECIFIED) meter device kit       blood glucose monitoring (NO BRAND SPECIFIED) test strip       Blood Glucose Monitoring Suppl (ACCU-CHEK COMPLETE) KIT       lisinopril (PRINIVIL,ZESTRIL) 2.5 MG tablet       MetFORMIN HCl (GLUCOPHAGE XR PO)       PREDNISONE PO       etanercept (ENBREL) 50 MG/ML injection       FOLIC ACID PO       PseudoePHEDrine-guaiFENesin 120-1200 MG TB12      Review of Systems   Constitutional: Positive for fever. Negative for chills and fatigue.   HENT: Positive for ear pain (left sided). Negative for sinus pressure, sinus pain, sore throat and tinnitus.    Respiratory: Positive for cough and shortness of breath.    Gastrointestinal: Positive  for nausea. Negative for constipation, diarrhea and vomiting.   Musculoskeletal: Positive for myalgias (usual with RA). Negative for neck pain and neck stiffness.   Allergic/Immunologic: Positive for immunocompromised state.   Neurological: Positive for headaches. Negative for dizziness, facial asymmetry and light-headedness.   All other systems reviewed and are negative.      Physical Exam   BP: 159/92  Pulse: 106  Temp: 97.8  F (36.6  C)  Resp: 18  SpO2: 96 %    Physical Exam  Vitals signs and nursing note reviewed.   Constitutional:       Appearance: She is obese.   HENT:      Head: Normocephalic and atraumatic.      Jaw: There is normal jaw occlusion.      Right Ear: Tympanic membrane, ear canal and external ear normal. Tympanic membrane is not erythematous.      Left Ear: Ear canal and external ear normal. Tympanic membrane is erythematous.      Nose: Congestion present.      Mouth/Throat:      Lips: Pink.      Mouth: Mucous membranes are moist.      Pharynx: Oropharynx is clear. Uvula midline. No oropharyngeal exudate, posterior oropharyngeal erythema or uvula swelling.      Tonsils: No tonsillar exudate or tonsillar abscesses.   Eyes:      Conjunctiva/sclera: Conjunctivae normal.   Neck:      Musculoskeletal: Full passive range of motion without pain, normal range of motion and neck supple.   Cardiovascular:      Rate and Rhythm: Normal rate and regular rhythm.      Pulses: Normal pulses.      Heart sounds: Normal heart sounds.   Pulmonary:      Effort: Pulmonary effort is normal.      Breath sounds: Normal breath sounds.   Abdominal:      General: Abdomen is flat.   Musculoskeletal: Normal range of motion.   Lymphadenopathy:      Head:      Right side of head: No submental, submandibular, tonsillar, preauricular, posterior auricular or occipital adenopathy.      Left side of head: No submental, submandibular, tonsillar, preauricular, posterior auricular or occipital adenopathy.      Cervical: No cervical  adenopathy.   Skin:     General: Skin is warm and dry.      Capillary Refill: Capillary refill takes less than 2 seconds.      Findings: No rash.   Neurological:      General: No focal deficit present.      Mental Status: She is alert and oriented to person, place, and time.       ED Course        Procedures               No results found for this or any previous visit (from the past 24 hour(s)).    Medications - No data to display    Assessments & Plan (with Medical Decision Making)     I have reviewed the nursing notes.    I have reviewed the findings, diagnosis, plan and need for follow up with the patient.  New Prescriptions    ALBUTEROL (PROAIR HFA/PROVENTIL HFA/VENTOLIN HFA) 108 (90 BASE) MCG/ACT INHALER    Inhale 2 puffs into the lungs every 6 hours as needed for shortness of breath / dyspnea or wheezing    AZITHROMYCIN (ZITHROMAX Z-CEDRICK) 250 MG TABLET    Two tablets on the first day, then one tablet daily for the next 4 days       Final diagnoses:   Upper respiratory tract infection, unspecified type   Patient is a 52-year-old female who presents to urgent care today with chief complaint of cough, left ear pain, intermittent nausea (without episode of vomiting), as well as a headache, all symptoms have been present for 7 days (see above for exact duration).  On physical examination and review of her vitals, patient's blood pressure is mildly elevated, she attributes this to her cough and not taking all of her medications yet this morning.  Cardiac and pulmonary exams are normal, there are no rhonchi, wheezing or rales noted in the upper or lower quadrants bilaterally.  I believe all the patient's symptoms are located in the upper respiratory tract, therefore, I did not feel additional laboratory or x-ray examination was needed, patient is in agreement with this.  She was prescribed an albuterol inhaler as well as a Z-cedrick for her symptoms.  As well COVID swab was performed, she should hear back in 24 to 72  hours with her results.  In the interim, I recommend she stay hydrated and advance her diet as tolerated, rest and symptomatic treatment were reviewed with her and are listed out on her AVS.  All questions and concerns were answered to patient satisfaction.  She is stable for discharge.  She is in agreement and understanding with above treatment plan.  She will follow-up with urgent care on an as-needed basis.  I recommend she follow-up with her PCP in 3 to 5 days.    Sita Hunter PA-C  10/3/2020   [facility]

## 2020-10-05 LAB
SARS-COV-2 RNA SPEC QL NAA+PROBE: NOT DETECTED
SPECIMEN SOURCE: NORMAL

## 2021-07-02 ENCOUNTER — MEDICAL CORRESPONDENCE (OUTPATIENT)
Dept: INTERVENTIONAL RADIOLOGY/VASCULAR | Facility: HOSPITAL | Age: 61
End: 2021-07-02

## 2021-07-02 ENCOUNTER — HOSPITAL ENCOUNTER (OUTPATIENT)
Facility: HOSPITAL | Age: 61
Discharge: HOME | End: 2021-07-02
Attending: RADIOLOGY | Admitting: RADIOLOGY
Payer: COMMERCIAL

## 2021-07-02 RX ORDER — LIDOCAINE HYDROCHLORIDE 10 MG/ML
5 INJECTION, SOLUTION EPIDURAL; INFILTRATION; INTRACAUDAL; PERINEURAL ONCE
Status: DISCONTINUED | OUTPATIENT
Start: 2021-07-02 | End: 2021-07-02 | Stop reason: HOSPADM

## 2021-07-02 RX ORDER — METHYLPREDNISOLONE ACETATE 80 MG/ML
80 INJECTION, SUSPENSION INTRA-ARTICULAR; INTRALESIONAL; INTRAMUSCULAR; SOFT TISSUE ONCE
Status: DISCONTINUED | OUTPATIENT
Start: 2021-07-02 | End: 2021-07-02 | Stop reason: HOSPADM

## 2021-07-02 RX ORDER — IOPAMIDOL 612 MG/ML
15 INJECTION, SOLUTION INTRATHECAL ONCE
Status: DISCONTINUED | OUTPATIENT
Start: 2021-07-02 | End: 2021-07-02 | Stop reason: HOSPADM

## 2021-09-01 ENCOUNTER — HOSPITAL ENCOUNTER (EMERGENCY)
Facility: HOSPITAL | Age: 61
Discharge: HOME | End: 2021-09-02
Attending: INTERNAL MEDICINE | Admitting: INTERNAL MEDICINE
Payer: COMMERCIAL

## 2021-09-01 ENCOUNTER — APPOINTMENT (OUTPATIENT)
Dept: GENERAL RADIOLOGY | Facility: HOSPITAL | Age: 61
End: 2021-09-01
Attending: INTERNAL MEDICINE
Payer: COMMERCIAL

## 2021-09-01 ENCOUNTER — APPOINTMENT (OUTPATIENT)
Dept: CT IMAGING | Facility: HOSPITAL | Age: 61
End: 2021-09-01
Attending: INTERNAL MEDICINE
Payer: COMMERCIAL

## 2021-09-01 DIAGNOSIS — S70.02XA HEMATOMA OF LEFT HIP, INITIAL ENCOUNTER: ICD-10-CM

## 2021-09-01 DIAGNOSIS — S09.90XA INJURY OF HEAD, INITIAL ENCOUNTER: ICD-10-CM

## 2021-09-01 DIAGNOSIS — S39.91XA BLUNT TRAUMA TO ABDOMEN, INITIAL ENCOUNTER: ICD-10-CM

## 2021-09-01 DIAGNOSIS — S20.212A CONTUSION OF LEFT CHEST WALL, INITIAL ENCOUNTER: ICD-10-CM

## 2021-09-01 LAB
ALBUMIN SERPL-MCNC: 3.9 G/DL (ref 3.4–5)
ALBUMIN UR-MCNC: 30 MG/DL
ALP SERPL-CCNC: 48 U/L (ref 40–150)
ALT SERPL W P-5'-P-CCNC: 36 U/L (ref 0–50)
ANION GAP SERPL CALCULATED.3IONS-SCNC: 3 MMOL/L (ref 3–14)
APPEARANCE UR: CLEAR
AST SERPL W P-5'-P-CCNC: 34 U/L (ref 0–45)
BACTERIA #/AREA URNS HPF: ABNORMAL /HPF
BASOPHILS # BLD AUTO: 0.1 10E3/UL (ref 0–0.2)
BASOPHILS NFR BLD AUTO: 1 %
BILIRUB SERPL-MCNC: 0.4 MG/DL (ref 0.2–1.3)
BILIRUB UR QL STRIP: NEGATIVE
BUN SERPL-MCNC: 12 MG/DL (ref 7–30)
CALCIUM SERPL-MCNC: 8.4 MG/DL (ref 8.5–10.1)
CHLORIDE BLD-SCNC: 107 MMOL/L (ref 94–109)
CO2 SERPL-SCNC: 28 MMOL/L (ref 20–32)
COLOR UR AUTO: YELLOW
CREAT SERPL-MCNC: 0.74 MG/DL (ref 0.52–1.04)
EOSINOPHIL # BLD AUTO: 0.1 10E3/UL (ref 0–0.7)
EOSINOPHIL NFR BLD AUTO: 0 %
ERYTHROCYTE [DISTWIDTH] IN BLOOD BY AUTOMATED COUNT: 13.4 % (ref 10–15)
GFR SERPL CREATININE-BSD FRML MDRD: 89 ML/MIN/1.73M2
GLUCOSE BLD-MCNC: 85 MG/DL (ref 70–99)
GLUCOSE UR STRIP-MCNC: NEGATIVE MG/DL
HCT VFR BLD AUTO: 44.8 % (ref 35–47)
HGB BLD-MCNC: 15.1 G/DL (ref 11.7–15.7)
HGB UR QL STRIP: NEGATIVE
HOLD SPECIMEN: NORMAL
IMM GRANULOCYTES # BLD: 0.1 10E3/UL
IMM GRANULOCYTES NFR BLD: 0 %
KETONES UR STRIP-MCNC: NEGATIVE MG/DL
LEUKOCYTE ESTERASE UR QL STRIP: ABNORMAL
LYMPHOCYTES # BLD AUTO: 1.5 10E3/UL (ref 0.8–5.3)
LYMPHOCYTES NFR BLD AUTO: 11 %
MCH RBC QN AUTO: 30.3 PG (ref 26.5–33)
MCHC RBC AUTO-ENTMCNC: 33.7 G/DL (ref 31.5–36.5)
MCV RBC AUTO: 90 FL (ref 78–100)
MONOCYTES # BLD AUTO: 1.1 10E3/UL (ref 0–1.3)
MONOCYTES NFR BLD AUTO: 8 %
MUCOUS THREADS #/AREA URNS LPF: PRESENT /LPF
NEUTROPHILS # BLD AUTO: 10.3 10E3/UL (ref 1.6–8.3)
NEUTROPHILS NFR BLD AUTO: 80 %
NITRATE UR QL: NEGATIVE
NRBC # BLD AUTO: 0 10E3/UL
NRBC BLD AUTO-RTO: 0 /100
PH UR STRIP: 7 [PH] (ref 4.7–8)
PLATELET # BLD AUTO: 368 10E3/UL (ref 150–450)
POTASSIUM BLD-SCNC: 4.9 MMOL/L (ref 3.4–5.3)
PROT SERPL-MCNC: 7.8 G/DL (ref 6.8–8.8)
RBC # BLD AUTO: 4.98 10E6/UL (ref 3.8–5.2)
RBC URINE: 1 /HPF
SODIUM SERPL-SCNC: 138 MMOL/L (ref 133–144)
SP GR UR STRIP: 1.02 (ref 1–1.03)
SQUAMOUS EPITHELIAL: 9 /HPF
UROBILINOGEN UR STRIP-MCNC: 2 MG/DL
WBC # BLD AUTO: 13 10E3/UL (ref 4–11)
WBC URINE: 5 /HPF

## 2021-09-01 PROCEDURE — 250N000011 HC RX IP 250 OP 636: Performed by: INTERNAL MEDICINE

## 2021-09-01 PROCEDURE — 87086 URINE CULTURE/COLONY COUNT: CPT | Performed by: INTERNAL MEDICINE

## 2021-09-01 PROCEDURE — 71260 CT THORAX DX C+: CPT

## 2021-09-01 PROCEDURE — 250N000013 HC RX MED GY IP 250 OP 250 PS 637: Performed by: INTERNAL MEDICINE

## 2021-09-01 PROCEDURE — 81001 URINALYSIS AUTO W/SCOPE: CPT | Performed by: INTERNAL MEDICINE

## 2021-09-01 PROCEDURE — 73562 X-RAY EXAM OF KNEE 3: CPT | Mod: LT

## 2021-09-01 PROCEDURE — 99284 EMERGENCY DEPT VISIT MOD MDM: CPT | Performed by: INTERNAL MEDICINE

## 2021-09-01 PROCEDURE — 36415 COLL VENOUS BLD VENIPUNCTURE: CPT | Performed by: INTERNAL MEDICINE

## 2021-09-01 PROCEDURE — 258N000003 HC RX IP 258 OP 636: Performed by: INTERNAL MEDICINE

## 2021-09-01 PROCEDURE — 99285 EMERGENCY DEPT VISIT HI MDM: CPT | Mod: 25

## 2021-09-01 PROCEDURE — 73030 X-RAY EXAM OF SHOULDER: CPT | Mod: LT

## 2021-09-01 PROCEDURE — 82040 ASSAY OF SERUM ALBUMIN: CPT | Performed by: INTERNAL MEDICINE

## 2021-09-01 PROCEDURE — 85048 AUTOMATED LEUKOCYTE COUNT: CPT | Performed by: INTERNAL MEDICINE

## 2021-09-01 RX ORDER — IOPAMIDOL 612 MG/ML
100 INJECTION, SOLUTION INTRATHECAL ONCE
Status: COMPLETED | OUTPATIENT
Start: 2021-09-01 | End: 2021-09-01

## 2021-09-01 RX ORDER — HYDROXYCHLOROQUINE SULFATE 200 MG/1
200 TABLET, FILM COATED ORAL 2 TIMES DAILY
COMMUNITY

## 2021-09-01 RX ORDER — ACETAMINOPHEN 325 MG/1
975 TABLET ORAL ONCE
Status: COMPLETED | OUTPATIENT
Start: 2021-09-01 | End: 2021-09-01

## 2021-09-01 RX ORDER — DULOXETIN HYDROCHLORIDE 60 MG/1
60 CAPSULE, DELAYED RELEASE ORAL DAILY
COMMUNITY

## 2021-09-01 RX ADMIN — IOPAMIDOL 100 ML: 612 INJECTION, SOLUTION INTRAVENOUS at 21:27

## 2021-09-01 RX ADMIN — ACETAMINOPHEN 975 MG: 325 TABLET, FILM COATED ORAL at 20:36

## 2021-09-01 RX ADMIN — SODIUM CHLORIDE 1000 ML: 9 INJECTION, SOLUTION INTRAVENOUS at 20:48

## 2021-09-01 ASSESSMENT — ENCOUNTER SYMPTOMS
BACK PAIN: 1
LIGHT-HEADEDNESS: 0
MYALGIAS: 1
DIFFICULTY URINATING: 0
SHORTNESS OF BREATH: 0
VOICE CHANGE: 0
CHILLS: 0
SPEECH DIFFICULTY: 0
CONFUSION: 0
NECK PAIN: 0
SEIZURES: 0
VOMITING: 0
FLANK PAIN: 1
COUGH: 0
NAUSEA: 0
ABDOMINAL PAIN: 1
BLOOD IN STOOL: 0
WOUND: 0
TREMORS: 0
DIZZINESS: 0
DYSURIA: 0
CHEST TIGHTNESS: 0
PALPITATIONS: 0
NECK STIFFNESS: 0
WHEEZING: 0
ARTHRALGIAS: 0
EYE REDNESS: 0
HEADACHES: 0
ABDOMINAL DISTENTION: 0
ANAL BLEEDING: 0
NUMBNESS: 0
COLOR CHANGE: 0
FEVER: 0
HEMATURIA: 0
DIAPHORESIS: 0

## 2021-09-02 VITALS
RESPIRATION RATE: 16 BRPM | TEMPERATURE: 98.1 F | DIASTOLIC BLOOD PRESSURE: 98 MMHG | OXYGEN SATURATION: 95 % | SYSTOLIC BLOOD PRESSURE: 142 MMHG | HEART RATE: 86 BPM

## 2021-09-02 NOTE — ED NOTES
Reports she fell down stairs. Denies LOC. Denies neck pain. Reports she wasn't going to present to ED but shortly before coming to ED she had a sudden onset of nausea, diaphoresis and dizziness. Reports she took tylenol for pain at 1200 today.  at bedside. Patient able to stand and transfer to cot from w/c without assistance.

## 2021-09-02 NOTE — DISCHARGE INSTRUCTIONS
What to expect when you have contrast    During your exam, we will inject  contrast  into your vein or artery. (Contrast is a clear liquid with iodine in it. It shows up on X-rays.)    You may feel warm or hot. You may have a metal taste in your mouth and a slight upset stomach. You may also feel pressure near the kidneys and bladder. These effects will last about 1 to 3 minutes.    Please tell us if you have:    Sneezing     Itching    Hives     Swelling in the face    A hoarse voice    Breathing problems    Other new symptoms    Serious problems are rare.  They may include:    Irregular heartbeat     Seizures    Kidney failure              Tissue damage    Shock      Death    If you have any problems during the exam, we  will treat them right away.    When you get home    Call your hospital if you have any new symptoms in the next 2 days, like hives or swelling. (Phone numbers are at the bottom of this page.) Or call your family doctor.     If you have wheezing or trouble breathing, call 911.    Self-care  -Drink at least 4 extra glasses of water today.   This reduces the stress on your kidneys.  -Keep taking your regular medicines.    The contrast will pass out of your body in your  Urine(pee). This will happen in the next 24 hours. You  will not feel this. Your urine will not  change color.      I have read and understand the above information.    Patient Sign Here:______________________________________Date:________Time:______    Staff Sign Here:________________________________________Date:_______Time:______      Radiology Departments:     ?[facility]: [phone] ?[place]: [phone]     ?[place]: [phone] ?[facility]:[phone]      ?[place]: [phone]  ?[place]: [phone]  ?[place]:[phone]    ?[facility] [place]:[phone]  ?[facility]):[phone]

## 2021-09-02 NOTE — ED PROVIDER NOTES
History     Chief Complaint   Patient presents with     Fall     c/o fall down a flight of stairs at 1030 today. notes hit the top of her head, neck pain, lt hip, knee and rib pain. denies loss of consciousness or h/a. stif neck collar applied in triage     The history is provided by the patient.   Trauma  Mechanism of injury: fall  Injury location: torso and head/neck  Injury location detail: scalp and head and L chest, L flank and abdomen  Incident location: home     Fall:       Fall occurred: down stairs       Impact surface: hard floor       Point of impact: buttocks, back, head and knees       Suspicion of alcohol use: no       Suspicion of drug use: no    EMS/PTA data:       Breathing interventions: none    Current symptoms:       Pain scale: 8/10       Pain quality: aching       Pain timing: constant       Associated symptoms:             Reports abdominal pain, back pain and chest pain.             Denies headache, nausea, neck pain, seizures and vomiting.         Allergies:  Allergies   Allergen Reactions     Codeine Other (See Comments) and Itching     SOB     Morphine Other (See Comments)     Low heart rate     Penicillins Other (See Comments) and Rash     Swelling       Problem List:    Patient Active Problem List    Diagnosis Date Noted     Tobacco use disorder 06/18/2014     Priority: Medium     Type 2 diabetes, HbA1C goal < 8% (H) 05/13/2013     Priority: Medium     Hypertension goal BP (blood pressure) < 130/80 05/13/2013     Priority: Medium     Hyperlipidemia LDL goal <100 05/13/2013     Priority: Medium        Past Medical History:    Past Medical History:   Diagnosis Date     Absence of menstruation 1/15/2001     Acute sinusitis, unspecified 1/3/2004     Acute upper respiratory infections of unspecified site 11/17/2000     Bronchitis, not specified as acute or chronic 11/20/2000     Care involving other physical therapy 4/17/2000     Chest pain, unspecified 10/12/2000     Diabetes mellitus  without mention of complication,  2003     Migraine, unspecified, without mention of intractable migraine without mention of status migrainosus 2002     Seronegative rheumatoid arthritis (H) 2016     Unspecified sinusitis (chronic) 3/28/2000       Past Surgical History:    Past Surgical History:   Procedure Laterality Date     Carpal tunnel syndrome      carpal tunnel release; RT     cholecystectomy       L3-L4 discectomy         Family History:    Family History   Problem Relation Age of Onset     C.A.D. Father      Other - See Comments Brother         Hyperlipidemia     Other - See Comments Mother         Hyperlipidemia/Lupus erythematosus/Osteoarthritis, with lumbar stenosis       Social History:  Marital Status:  Single [1]  Social History     Tobacco Use     Smoking status: Former Smoker     Packs/day: 1.00     Years: 30.00     Pack years: 30.00     Types: Cigarettes     Quit date: 10/26/2015     Years since quittin.8     Smokeless tobacco: Never Used     Tobacco comment: Tried to quit; longest tobacco free, 3 years; no passive exposure   Substance Use Topics     Alcohol use: No     Drug use: No        Medications:    DULoxetine (CYMBALTA) 60 MG capsule  etanercept (ENBREL) 50 MG/ML injection  hydroxychloroquine (PLAQUENIL) 200 MG tablet  lisinopril (PRINIVIL,ZESTRIL) 2.5 MG tablet  PREDNISONE PO  albuterol (PROAIR HFA/PROVENTIL HFA/VENTOLIN HFA) 108 (90 Base) MCG/ACT inhaler  aspirin 81 MG tablet  blood glucose (NO BRAND SPECIFIED) lancets standard  blood glucose monitoring (NO BRAND SPECIFIED) meter device kit  blood glucose monitoring (NO BRAND SPECIFIED) test strip  Blood Glucose Monitoring Suppl (ACCU-CHEK COMPLETE) KIT  FOLIC ACID PO  PseudoePHEDrine-guaiFENesin 120-1200 MG TB12          Review of Systems   Constitutional: Negative for chills, diaphoresis and fever.   HENT: Negative for congestion and voice change.    Eyes: Negative for redness and visual disturbance.   Respiratory:  Negative for cough, chest tightness, shortness of breath and wheezing.    Cardiovascular: Positive for chest pain. Negative for palpitations and leg swelling.   Gastrointestinal: Positive for abdominal pain. Negative for abdominal distention, anal bleeding, blood in stool, nausea and vomiting.   Genitourinary: Positive for flank pain. Negative for decreased urine volume, difficulty urinating, dysuria and hematuria.   Musculoskeletal: Positive for back pain and myalgias. Negative for arthralgias, gait problem, neck pain and neck stiffness.   Skin: Negative for color change, pallor, rash and wound.   Neurological: Negative for dizziness, tremors, seizures, syncope, speech difficulty, light-headedness, numbness and headaches.   Psychiatric/Behavioral: Negative for confusion and suicidal ideas.   All other systems reviewed and are negative.      Physical Exam   BP: 155/82  Pulse: 92  Temp: (!) 96.4  F (35.8  C)  Resp: 18  SpO2: 97 %      Physical Exam  Vitals and nursing note reviewed.   Constitutional:       Appearance: She is well-developed.   HENT:      Head: Normocephalic and atraumatic.      Mouth/Throat:      Pharynx: No oropharyngeal exudate.   Eyes:      Conjunctiva/sclera: Conjunctivae normal.      Pupils: Pupils are equal, round, and reactive to light.   Neck:      Thyroid: No thyromegaly.      Vascular: No JVD.      Trachea: No tracheal deviation.   Cardiovascular:      Rate and Rhythm: Normal rate and regular rhythm.      Heart sounds: Normal heart sounds. No murmur heard.   No friction rub. No gallop.    Pulmonary:      Effort: Pulmonary effort is normal. No respiratory distress.      Breath sounds: Normal breath sounds. No stridor. No wheezing or rales.       Chest:      Chest wall: Tenderness present.          Comments: Tenderness and swelling on left collar bone,   Left lower lateral ribs , LLQ pain and tenderness    Abdominal:      General: Bowel sounds are normal. There is no distension.       Palpations: Abdomen is soft. There is no mass.      Tenderness: There is no abdominal tenderness. There is no guarding or rebound.   Musculoskeletal:         General: Normal range of motion.      Cervical back: Normal range of motion and neck supple.      Left hip: Tenderness present. No deformity, lacerations, bony tenderness or crepitus.      Left knee: Swelling present. No lacerations, bony tenderness or crepitus. Tenderness present over the medial joint line.   Lymphadenopathy:      Cervical: No cervical adenopathy.   Skin:     General: Skin is warm and dry.      Coloration: Skin is not pale.      Findings: No erythema or rash.   Neurological:      Mental Status: She is alert and oriented to person, place, and time.   Psychiatric:         Behavior: Behavior normal.         ED Course        Procedures                  Results for orders placed or performed during the hospital encounter of 09/01/21 (from the past 24 hour(s))   Extra Tube    Narrative    The following orders were created for panel order Extra Tube.  Procedure                               Abnormality         Status                     ---------                               -----------         ------                     Extra Blue Top Tube[[id number]]                              Final result               Extra Red Top Tube[[id number]]                               Final result               Extra Green Top (Lithium...[[id number]]                      Final result               Extra Purple Top Tube[[id number]]                            Final result                 Please view results for these tests on the individual orders.   Extra Blue Top Tube   Result Value Ref [place]    Hold Specimen JIC    Extra Red Top Tube   Result Value Ref [place]    Hold Specimen JIC    Extra Green Top (Lithium Heparin) Tube   Result Value Ref [place]    Hold Specimen JIC    Extra Purple Top Tube   Result Value Ref [place]    Hold Specimen JIC    CBC with Platelets &  Differential    Narrative    The following orders were created for panel order CBC with Platelets & Differential.  Procedure                               Abnormality         Status                     ---------                               -----------         ------                     CBC with platelets and d...[[id number]]  Abnormal            Final result                 Please view results for these tests on the individual orders.   Comprehensive metabolic panel   Result Value Ref [place]    Sodium 138 133 - 144 mmol/L    Potassium 4.9 3.4 - 5.3 mmol/L    Chloride 107 94 - 109 mmol/L    Carbon Dioxide (CO2) 28 20 - 32 mmol/L    Anion Gap 3 3 - 14 mmol/L    Urea Nitrogen 12 7 - 30 mg/dL    Creatinine 0.74 0.52 - 1.04 mg/dL    Calcium 8.4 (L) 8.5 - 10.1 mg/dL    Glucose 85 70 - 99 mg/dL    Alkaline Phosphatase 48 40 - 150 U/L    AST 34 0 - 45 U/L    ALT 36 0 - 50 U/L    Protein Total 7.8 6.8 - 8.8 g/dL    Albumin 3.9 3.4 - 5.0 g/dL    Bilirubin Total 0.4 0.2 - 1.3 mg/dL    GFR Estimate 89 >60 mL/min/1.73m2   CBC with platelets and differential   Result Value Ref [place]    WBC Count 13.0 (H) 4.0 - 11.0 10e3/uL    RBC Count 4.98 3.80 - 5.20 10e6/uL    Hemoglobin 15.1 11.7 - 15.7 g/dL    Hematocrit 44.8 35.0 - 47.0 %    MCV 90 78 - 100 fL    MCH 30.3 26.5 - 33.0 pg    MCHC 33.7 31.5 - 36.5 g/dL    RDW 13.4 10.0 - 15.0 %    Platelet Count 368 150 - 450 10e3/uL    % Neutrophils 80 %    % Lymphocytes 11 %    % Monocytes 8 %    % Eosinophils 0 %    % Basophils 1 %    % Immature Granulocytes 0 %    NRBCs per 100 WBC 0 <1 /100    Absolute Neutrophils 10.3 (H) 1.6 - 8.3 10e3/uL    Absolute Lymphocytes 1.5 0.8 - 5.3 10e3/uL    Absolute Monocytes 1.1 0.0 - 1.3 10e3/uL    Absolute Eosinophils 0.1 0.0 - 0.7 10e3/uL    Absolute Basophils 0.1 0.0 - 0.2 10e3/uL    Absolute Immature Granulocytes 0.1 (H) <=0.0 10e3/uL    Absolute NRBCs 0.0 10e3/uL   UA reflex to Microscopic and Culture    Specimen: Urine, Midstream   Result Value Ref  [place]    Color Urine Yellow Colorless, Straw, Light Yellow, Yellow    Appearance Urine Clear Clear    Glucose Urine Negative Negative mg/dL    Bilirubin Urine Negative Negative    Ketones Urine Negative Negative mg/dL    Specific Gravity Urine 1.024 1.003 - 1.035    Blood Urine Negative Negative    pH Urine 7.0 4.7 - 8.0    Protein Albumin Urine 30  (A) Negative mg/dL    Urobilinogen Urine 2.0 Normal, 2.0 mg/dL    Nitrite Urine Negative Negative    Leukocyte Esterase Urine Moderate (A) Negative    Bacteria Urine Few (A) None Seen /HPF    Mucus Urine Present (A) None Seen /LPF    RBC Urine 1 <=2 /HPF    WBC Urine 5 <=5 /HPF    Squamous Epithelials Urine 9 (H) <=1 /HPF    Narrative    Urine Culture ordered based on laboratory criteria   Urine Culture    Specimen: Urine, Midstream   Result Value Ref [place]    Culture Culture in progress    CT Chest/Abdomen/Pelvis w Contrast    Narrative    Exam: CT CHEST/ABDOMEN/PELVIS W CONTRAST    Exam reason: Chest-abdomen-pelvis trauma, blunt    Technique: Using multidetector helical CT technique, images of the  chest, abdomen, and pelvis were obtained with IV contrast.  Coronal  and sagittal reconstructions also performed. Thick slab coronal MIP  reconstructions of the chest also performed.    Meds/Contrast: Isovue 300, 100 mL    Comparison: CT of the chest on 11/2/2015     FINDINGS:    CHEST:  Lungs/Airways: There is a 6 mm average diameter nodule in the right  lower lobe (series 9 image 227). There is a stable 4 mm nodule in the  lingula (series 4 image 61). There are a few scattered micronodules.  There are a few foci of stable chronic atelectasis and/or scarring in  the lingula. Stable small foci of bibasilar scarring/atelectasis.  Adenike/Mediastinum: No adenopathy or mass.   Pleura: No pleural effusion or nodule. No pneumothorax.  Cardiovascular: No aortic aneurysm or any significant cardiovascular  finding.   Chest wall/Axilla: Within normal limits.    There is a 1.5 cm right  thyroid nodule.    ABDOMEN:  Liver: No mass or any significant abnormality.  Gallbladder: Resected.   Bile Ducts: No biliary ductal dilation.   Spleen: No splenomegaly or focal lesion.  Pancreas: No mass, ductal dilatation, or inflammatory changes.  Kidneys: No solid mass or hydronephrosis, or any definite calculi.  Subcentimeter hypodensity in the right kidney is most likely a benign  cyst.  Adrenals: No nodules.   Lymph Nodes: No adenopathy.   Vascular: No aortic aneurysm.   Abdominal Wall: There is a small amount of stranding in the  subcutaneous tissues overlying the left hip.    PELVIS:   No mass or adenopathy.     Bowel/Peritoneal Cavity/Mesentery:  -No bowel obstruction or significant ileus.  -No acute inflammatory changes.  -No pneumoperitoneum.  -No ascites.    Musculoskeletal: There is some cortical regularity of the  anterolateral right sixth rib, without a clear fracture line.  Multilevel degenerative changes of the lumbar spine.      Impression    IMPRESSION:    1. There are several scattered pulmonary nodules, the largest  measuring 6 mm mean diameter. Follow-up CT of the chest in 6-12 months  is recommended.  2. Cortical irregularity of the anterolateral right sixth rib without  a clear fracture line, favored to represent a healed rib fracture  3. There is a 1.5 cm right thyroid nodule, for which nonemergent  thyroid ultrasound could be considered for further evaluation.  4. There is swelling or hematoma in the subcutaneous tissues overlying  the left hip.   5. No other acute findings in the abdomen or pelvis.    MARTIN MERCER MD         SYSTEM ID:  [id number]   XR Knee Left 3 Views    Narrative    PROCEDURE: XR KNEE LEFT 3 VIEWS 9/1/2021 9:44 PM    HISTORY: trauma    COMPARISONS: None.    TECHNIQUE: 3 views.    FINDINGS: No acute fracture or dislocation is seen. No joint effusion  or focal bone lesion is seen.    Joint spaces are fairly well preserved.         Impression    IMPRESSION: No acute  fracture.    CATHY DEMPSEY MD         SYSTEM ID:  [id number]   XR Shoulder Left G/E 3 Views    Narrative    PROCEDURE: XR SHOULDER LEFT G/E 3 VIEWS 9/1/2021 11:04 PM    HISTORY: trauma    COMPARISONS: None.    TECHNIQUE: 4 views.    FINDINGS: No fracture or dislocation is seen. There is no focal bone  lesion. Acromiohumeral distance is fairly normal.         Impression    IMPRESSION: No acute fracture.    CATHY DEMPSEY MD         SYSTEM ID:  [id number]       Medications   iopamidol (ISOVUE-M-300) solution 100 mL (100 mLs Intravenous Given 9/1/21 2127)   sodium chloride (PF) 0.9% PF flush 50 mL (50 mLs Intravenous Given 9/1/21 2128)   acetaminophen (TYLENOL) tablet 975 mg (975 mg Oral Given 9/1/21 2036)   0.9% sodium chloride BOLUS (0 mLs Intravenous Stopped 9/2/21 0015)       Assessments & Plan (with Medical Decision Making)   Fell from stairs rolled on left side of her body, also hit his head, denies LOC, vomiting, dizziness  Labs reviewed  Xray shoulder and knee: negative  CT chest abd vrad: left hip soft tissue swelling/ hematoma, non emergent finding , lung and thyroid nodule  I explained above CT finding to patient advised patient to follow with PCP for follow up official report of CT and reevaluation and if indicated outpatient workup to rule out malignancy, if symptoms got worse return to ER  She understood and agreed.   I have reviewed the nursing notes.    I have reviewed the findings, diagnosis, plan and need for follow up with the patient.     Discharge Medication List as of 9/2/2021 12:16 AM          Final diagnoses:   Contusion of left chest wall, initial encounter   Blunt trauma to abdomen, initial encounter   Hematoma of left hip, initial encounter   Injury of head, initial encounter       9/1/2021   [facility]     Wong Saldivar MD  09/02/21 2369

## 2021-09-03 LAB — BACTERIA UR CULT: NORMAL

## 2022-03-08 ENCOUNTER — MEDICAL CORRESPONDENCE (OUTPATIENT)
Dept: CT IMAGING | Facility: HOSPITAL | Age: 62
End: 2022-03-08
Payer: COMMERCIAL

## 2022-03-10 ENCOUNTER — HOSPITAL ENCOUNTER (OUTPATIENT)
Dept: CT IMAGING | Facility: HOSPITAL | Age: 62
Discharge: HOME | End: 2022-03-10
Attending: NURSE PRACTITIONER | Admitting: NURSE PRACTITIONER
Payer: COMMERCIAL

## 2022-03-10 DIAGNOSIS — R91.8 OTHER NONSPECIFIC ABNORMAL FINDING OF LUNG FIELD: ICD-10-CM

## 2022-03-10 PROCEDURE — 71250 CT THORAX DX C-: CPT

## 2023-01-06 ENCOUNTER — LAB REQUISITION (OUTPATIENT)
Dept: LAB | Facility: HOSPITAL | Age: 63
End: 2023-01-06
Payer: COMMERCIAL

## 2023-01-06 ENCOUNTER — LAB (OUTPATIENT)
Dept: LAB | Facility: HOSPITAL | Age: 63
End: 2023-01-06
Payer: COMMERCIAL

## 2023-01-06 DIAGNOSIS — R06.02 SHORTNESS OF BREATH: ICD-10-CM

## 2023-01-06 DIAGNOSIS — R07.1 CHEST PAIN ON BREATHING: ICD-10-CM

## 2023-01-06 DIAGNOSIS — R79.89 ELEVATED TROPONIN: Primary | ICD-10-CM

## 2023-01-06 LAB
NT-PROBNP SERPL-MCNC: 45 PG/ML (ref 0–900)
TROPONIN T SERPL HS-MCNC: 13 NG/L
TROPONIN T SERPL HS-MCNC: 19 NG/L

## 2023-01-06 PROCEDURE — 36415 COLL VENOUS BLD VENIPUNCTURE: CPT

## 2023-01-06 PROCEDURE — 84484 ASSAY OF TROPONIN QUANT: CPT | Mod: 91

## 2023-01-06 PROCEDURE — 83880 ASSAY OF NATRIURETIC PEPTIDE: CPT | Performed by: NURSE PRACTITIONER

## 2023-01-06 PROCEDURE — 84484 ASSAY OF TROPONIN QUANT: CPT | Performed by: NURSE PRACTITIONER

## 2023-04-24 ENCOUNTER — MEDICAL CORRESPONDENCE (OUTPATIENT)
Dept: CT IMAGING | Facility: HOSPITAL | Age: 63
End: 2023-04-24

## 2023-04-28 ENCOUNTER — HOSPITAL ENCOUNTER (OUTPATIENT)
Dept: CT IMAGING | Facility: HOSPITAL | Age: 63
Discharge: HOME | End: 2023-04-28
Attending: NURSE PRACTITIONER | Admitting: NURSE PRACTITIONER
Payer: COMMERCIAL

## 2023-04-28 DIAGNOSIS — R91.8 OTHER NONSPECIFIC ABNORMAL FINDING OF LUNG FIELD: ICD-10-CM

## 2023-04-28 PROCEDURE — 71250 CT THORAX DX C-: CPT

## 2023-06-08 DIAGNOSIS — M06.09 RHEUMATOID ARTHRITIS OF MULTIPLE SITES WITHOUT RHEUMATOID FACTOR (H): Primary | ICD-10-CM

## 2023-06-08 DIAGNOSIS — Z79.899 DRUG THERAPY: ICD-10-CM

## 2023-06-19 ENCOUNTER — APPOINTMENT (OUTPATIENT)
Dept: GENERAL RADIOLOGY | Facility: HOSPITAL | Age: 63
End: 2023-06-19
Attending: PHYSICIAN ASSISTANT
Payer: COMMERCIAL

## 2023-06-19 ENCOUNTER — HOSPITAL ENCOUNTER (EMERGENCY)
Facility: HOSPITAL | Age: 63
Discharge: HOME | End: 2023-06-19
Attending: PHYSICIAN ASSISTANT | Admitting: PHYSICIAN ASSISTANT
Payer: COMMERCIAL

## 2023-06-19 VITALS
HEART RATE: 98 BPM | SYSTOLIC BLOOD PRESSURE: 140 MMHG | BODY MASS INDEX: 39.47 KG/M2 | OXYGEN SATURATION: 95 % | RESPIRATION RATE: 16 BRPM | TEMPERATURE: 98.5 F | WEIGHT: 252 LBS | DIASTOLIC BLOOD PRESSURE: 75 MMHG

## 2023-06-19 DIAGNOSIS — S93.401A SPRAIN OF RIGHT ANKLE, UNSPECIFIED LIGAMENT, INITIAL ENCOUNTER: ICD-10-CM

## 2023-06-19 PROCEDURE — 99213 OFFICE O/P EST LOW 20 MIN: CPT | Performed by: PHYSICIAN ASSISTANT

## 2023-06-19 PROCEDURE — 73630 X-RAY EXAM OF FOOT: CPT | Mod: RT

## 2023-06-19 PROCEDURE — G0463 HOSPITAL OUTPT CLINIC VISIT: HCPCS

## 2023-06-19 PROCEDURE — 73610 X-RAY EXAM OF ANKLE: CPT | Mod: RT

## 2023-06-19 ASSESSMENT — ACTIVITIES OF DAILY LIVING (ADL): ADLS_ACUITY_SCORE: 35

## 2023-06-19 NOTE — ED TRIAGE NOTES
Pt presents with c/o ankle pain  Right ankle pain, does have numbness and tingling, is able to wiggle her toes,  Ankle is swollen, and does have bruising and does have some pain radiating up to the calf.  Pain 8/10  Did ice it.  No otc meds

## 2023-06-19 NOTE — ED TRIAGE NOTES
Patient presents after she states she tripped today. Having swelling and more pain with ambulation

## 2023-06-20 NOTE — DISCHARGE INSTRUCTIONS
Rest the affected painful area as much as possible.    Apply ice for 15-20 minutes intermittently as needed and especially after any offending activity.   Daily stretching.    As pain recedes, begin normal activities slowly as tolerated.   Return here with any new or worsening symptoms.

## 2023-06-20 NOTE — ED PROVIDER NOTES
History     Chief Complaint   Patient presents with     Ankle Pain     HPI  Nevin Eckert is a 63 year old female who presents with right ankle pain/swelling after rolling her ankle earlier today. It is painful to bear weight.     Allergies:  Allergies   Allergen Reactions     Aleve [Naproxen] Headache     Imitrex [Sumatriptan] Palpitations     Codeine Other (See Comments) and Itching     SOB     Morphine Other (See Comments)     Low heart rate     Penicillins Other (See Comments) and Rash     Swelling       Problem List:    Patient Active Problem List    Diagnosis Date Noted     Tobacco use disorder 06/18/2014     Priority: Medium     Type 2 diabetes, HbA1C goal < 8% (H) 05/13/2013     Priority: Medium     Hypertension goal BP (blood pressure) < 130/80 05/13/2013     Priority: Medium     Hyperlipidemia LDL goal <100 05/13/2013     Priority: Medium        Past Medical History:    Past Medical History:   Diagnosis Date     Absence of menstruation 1/15/2001     Acute sinusitis, unspecified 1/3/2004     Acute upper respiratory infections of unspecified site 11/17/2000     Bronchitis, not specified as acute or chronic 11/20/2000     Care involving other physical therapy 4/17/2000     Chest pain, unspecified 10/12/2000     Diabetes mellitus without mention of complication,  2/4/2003     Migraine, unspecified, without mention of intractable migraine without mention of status migrainosus 12/5/2002     Seronegative rheumatoid arthritis (H) 02/24/2016     Unspecified sinusitis (chronic) 3/28/2000       Past Surgical History:    Past Surgical History:   Procedure Laterality Date     Carpal tunnel syndrome      carpal tunnel release; RT     cholecystectomy       L3-L4 discectomy         Family History:    Family History   Problem Relation Age of Onset     C.A.D. Father      Other - See Comments Brother         Hyperlipidemia     Other - See Comments Mother         Hyperlipidemia/Lupus erythematosus/Osteoarthritis, with  lumbar stenosis       Social History:  Marital Status:  Single [1]  Social History     Tobacco Use     Smoking status: Former     Packs/day: 1.00     Years: 30.00     Pack years: 30.00     Types: Cigarettes     Quit date: 10/26/2015     Years since quittin.6     Smokeless tobacco: Never     Tobacco comments:     Tried to quit; longest tobacco free, 3 years; no passive exposure   Substance Use Topics     Alcohol use: No     Drug use: No        Medications:    albuterol (PROAIR HFA/PROVENTIL HFA/VENTOLIN HFA) 108 (90 Base) MCG/ACT inhaler  aspirin 81 MG tablet  blood glucose (NO BRAND SPECIFIED) lancets standard  blood glucose monitoring (NO BRAND SPECIFIED) meter device kit  blood glucose monitoring (NO BRAND SPECIFIED) test strip  Blood Glucose Monitoring Suppl (ACCU-CHEK COMPLETE) KIT  DULoxetine (CYMBALTA) 60 MG capsule  etanercept (ENBREL) 50 MG/ML injection  FOLIC ACID PO  hydroxychloroquine (PLAQUENIL) 200 MG tablet  lisinopril (PRINIVIL,ZESTRIL) 2.5 MG tablet  PREDNISONE PO  PseudoePHEDrine-guaiFENesin 120-1200 MG TB12          Review of Systems   All other systems reviewed and are negative.      Physical Exam   BP: 140/75  Pulse: 98  Temp: 98.5  F (36.9  C)  Resp: 16  Weight: 114.3 kg (252 lb)  SpO2: 95 %      Physical Exam  Vitals and nursing note reviewed.   Constitutional:       General: She is in acute distress (Mildy distressed due to pain. ).      Appearance: Normal appearance.   Cardiovascular:      Rate and Rhythm: Normal rate.      Pulses: Normal pulses.   Pulmonary:      Effort: Pulmonary effort is normal.   Musculoskeletal:      Right knee: Normal.      Right lower leg: Normal.      Right ankle: Swelling present. Tenderness present over the lateral malleolus and medial malleolus. Normal pulse.      Right Achilles Tendon: Normal.      Right foot: Normal capillary refill. Swelling and tenderness present. Normal pulse.   Skin:     General: Skin is warm.      Capillary Refill: Capillary refill  takes less than 2 seconds.   Neurological:      Mental Status: She is alert.         ED Course                 Procedures           Results for orders placed or performed during the hospital encounter of 06/19/23 (from the past 24 hour(s))   Ankle XR, G/E 3 views, right    Narrative    Exam: XR ANKLE RIGHT G/E 3 VIEWS     History:Female, age 63 years, rolled ankle, right ankle pain    Comparison:  No relevant prior imaging.    Technique: Three views are submitted.    Findings: Bones are normally mineralized. No evidence of acute or  subacute fracture.  No evidence of dislocation.           Impression    Impression:  No evidence of acute or subacute bony abnormality.     Lateral soft tissue swelling.    Mild/moderate tibiotalar joint space degenerative change.    KENNY ALMARAZ MD         SYSTEM ID:  [id number]   Foot  XR, G/E 3 views, right    Narrative    Exam: XR FOOT RIGHT G/E 3 VIEWS     History:Female, age 63 years, rolled ankle, foot/ankle pain    Comparison:  No relevant prior imaging.    Technique: Three views are submitted.    Findings: Bones are normally mineralized. No evidence of acute or  subacute fracture.  No evidence of dislocation.           Impression    Impression:  No evidence of acute or subacute bony abnormality.     Mild/moderate midfoot and hindfoot degenerative changes.    KENNY ALMARAZ MD         SYSTEM ID:  [id number]       Medications - No data to display    Assessments & Plan (with Medical Decision Making)   X-ray of the right foot and ankle are negative for acute fracture. Exam is consistent with right ankle sprain. An ace wrap and gel ankle splint was applied. CMS intact following. Pt declines pain medication while here. Declines crutches, will continue using her cane. She was discharged home following in stable condition.     Plan: Rest the affected painful area as much as possible.    Apply ice for 15-20 minutes intermittently as needed and especially after any offending activity.    Daily stretching.    As pain recedes, begin normal activities slowly as tolerated.   Return here with any new or worsening symptoms.     I have reviewed the nursing notes.    I have reviewed the findings, diagnosis, plan and need for follow up with the patient.    New Prescriptions    No medications on file       Final diagnoses:   Sprain of right ankle, unspecified ligament, initial encounter       6/19/2023   [facility]

## 2023-07-12 ENCOUNTER — MEDICAL CORRESPONDENCE (OUTPATIENT)
Dept: NUCLEAR MEDICINE | Facility: HOSPITAL | Age: 63
End: 2023-07-12

## 2023-07-24 ENCOUNTER — HOSPITAL ENCOUNTER (OUTPATIENT)
Dept: NUCLEAR MEDICINE | Facility: HOSPITAL | Age: 63
Discharge: HOME | End: 2023-07-24
Payer: COMMERCIAL

## 2023-07-24 DIAGNOSIS — E05.91 THYROTOXICOSIS, UNSPECIFIED WITH THYROTOXIC CRISIS OR STORM: ICD-10-CM

## 2023-07-24 DIAGNOSIS — E04.2 NONTOXIC MULTINODULAR GOITER: ICD-10-CM

## 2023-07-24 PROCEDURE — A9516 IODINE I-123 SOD IODIDE MIC: HCPCS | Performed by: RADIOLOGY

## 2023-07-24 PROCEDURE — 343N000001 HC RX 343: Performed by: RADIOLOGY

## 2023-07-24 PROCEDURE — 78014 THYROID IMAGING W/BLOOD FLOW: CPT

## 2023-07-24 RX ADMIN — Medication 231 MICROCURIE: at 12:30

## 2023-07-25 ENCOUNTER — APPOINTMENT (OUTPATIENT)
Dept: GENERAL RADIOLOGY | Facility: HOSPITAL | Age: 63
End: 2023-07-25
Attending: PHYSICIAN ASSISTANT
Payer: COMMERCIAL

## 2023-07-25 ENCOUNTER — HOSPITAL ENCOUNTER (EMERGENCY)
Facility: HOSPITAL | Age: 63
Discharge: HOME | End: 2023-07-25
Attending: PHYSICIAN ASSISTANT | Admitting: PHYSICIAN ASSISTANT
Payer: COMMERCIAL

## 2023-07-25 ENCOUNTER — HOSPITAL ENCOUNTER (OUTPATIENT)
Dept: NUCLEAR MEDICINE | Facility: HOSPITAL | Age: 63
Discharge: HOME | End: 2023-07-25
Payer: COMMERCIAL

## 2023-07-25 VITALS
OXYGEN SATURATION: 94 % | HEART RATE: 97 BPM | TEMPERATURE: 98.4 F | DIASTOLIC BLOOD PRESSURE: 84 MMHG | RESPIRATION RATE: 16 BRPM | SYSTOLIC BLOOD PRESSURE: 119 MMHG

## 2023-07-25 DIAGNOSIS — M77.52 LEFT ANKLE TENDINITIS: ICD-10-CM

## 2023-07-25 PROCEDURE — 73630 X-RAY EXAM OF FOOT: CPT | Mod: LT

## 2023-07-25 PROCEDURE — G0463 HOSPITAL OUTPT CLINIC VISIT: HCPCS

## 2023-07-25 PROCEDURE — 99213 OFFICE O/P EST LOW 20 MIN: CPT | Performed by: PHYSICIAN ASSISTANT

## 2023-07-25 PROCEDURE — 250N000013 HC RX MED GY IP 250 OP 250 PS 637: Performed by: PHYSICIAN ASSISTANT

## 2023-07-25 RX ORDER — ACETAMINOPHEN 325 MG/1
975 TABLET ORAL ONCE
Status: COMPLETED | OUTPATIENT
Start: 2023-07-25 | End: 2023-07-25

## 2023-07-25 RX ADMIN — ACETAMINOPHEN 975 MG: 325 TABLET, FILM COATED ORAL at 14:56

## 2023-07-25 ASSESSMENT — ENCOUNTER SYMPTOMS
CARDIOVASCULAR NEGATIVE: 1
JOINT SWELLING: 1
ARTHRALGIAS: 1
COLOR CHANGE: 0
CONSTITUTIONAL NEGATIVE: 1
RESPIRATORY NEGATIVE: 1

## 2023-07-25 ASSESSMENT — ACTIVITIES OF DAILY LIVING (ADL): ADLS_ACUITY_SCORE: 35

## 2023-07-25 NOTE — ED TRIAGE NOTES
Pt presents with c/o left ankle pain  Denies any injury to ankle  Has been doing RICE.  Pain is still increasing  States that she is having a hard time baring weight to the left ankle.

## 2023-07-25 NOTE — ED PROVIDER NOTES
History     Chief Complaint   Patient presents with    Ankle Pain     HPI  Nevin Eckert is a 63 year old female with Hx DMII, HTN, RA who presents with LT ankle pain and swelling. Pain started over past few weeks after she tripped, injuring her RT ankle. RT ankle has improved. She reports pain with standing/weight bearing. Location: lateral foot/heel. Denies erythema. No Hx gout. No memorable injury other than when she tripped last month.     Allergies:  Allergies   Allergen Reactions    Aleve [Naproxen] Headache    Imitrex [Sumatriptan] Palpitations    Codeine Other (See Comments) and Itching     SOB    Morphine Other (See Comments)     Low heart rate    Penicillins Other (See Comments) and Rash     Swelling       Problem List:    Patient Active Problem List    Diagnosis Date Noted    Tobacco use disorder 06/18/2014     Priority: Medium    Type 2 diabetes, HbA1C goal < 8% (H) 05/13/2013     Priority: Medium    Hypertension goal BP (blood pressure) < 130/80 05/13/2013     Priority: Medium    Hyperlipidemia LDL goal <100 05/13/2013     Priority: Medium        Past Medical History:    Past Medical History:   Diagnosis Date    Absence of menstruation 1/15/2001    Acute sinusitis, unspecified 1/3/2004    Acute upper respiratory infections of unspecified site 11/17/2000    Bronchitis, not specified as acute or chronic 11/20/2000    Care involving other physical therapy 4/17/2000    Chest pain, unspecified 10/12/2000    Diabetes mellitus without mention of complication,  2/4/2003    Migraine, unspecified, without mention of intractable migraine without mention of status migrainosus 12/5/2002    Seronegative rheumatoid arthritis (H) 02/24/2016    Unspecified sinusitis (chronic) 3/28/2000       Past Surgical History:    Past Surgical History:   Procedure Laterality Date    Carpal tunnel syndrome      carpal tunnel release; RT    cholecystectomy      L3-L4 discectomy         Family History:    Family History   Problem  Relation Age of Onset    C.A.D. Father     Other - See Comments Brother         Hyperlipidemia    Other - See Comments Mother         Hyperlipidemia/Lupus erythematosus/Osteoarthritis, with lumbar stenosis       Social History:  Marital Status:  Single [1]  Social History     Tobacco Use    Smoking status: Former     Packs/day: 1.00     Years: 30.00     Pack years: 30.00     Types: Cigarettes     Quit date: 10/26/2015     Years since quittin.7    Smokeless tobacco: Never    Tobacco comments:     Tried to quit; longest tobacco free, 3 years; no passive exposure   Substance Use Topics    Alcohol use: No    Drug use: No        Medications:    albuterol (PROAIR HFA/PROVENTIL HFA/VENTOLIN HFA) 108 (90 Base) MCG/ACT inhaler  aspirin 81 MG tablet  blood glucose (NO BRAND SPECIFIED) lancets standard  blood glucose monitoring (NO BRAND SPECIFIED) meter device kit  blood glucose monitoring (NO BRAND SPECIFIED) test strip  Blood Glucose Monitoring Suppl (ACCU-CHEK COMPLETE) KIT  DULoxetine (CYMBALTA) 60 MG capsule  etanercept (ENBREL) 50 MG/ML injection  FOLIC ACID PO  hydroxychloroquine (PLAQUENIL) 200 MG tablet  lisinopril (PRINIVIL,ZESTRIL) 2.5 MG tablet  PREDNISONE PO  PseudoePHEDrine-guaiFENesin 120-1200 MG TB12          Review of Systems   Constitutional: Negative.    Respiratory: Negative.     Cardiovascular: Negative.    Musculoskeletal:  Positive for arthralgias, gait problem and joint swelling.   Skin:  Negative for color change.       Physical Exam   BP: 119/84  Pulse: 97  Temp: 98.4  F (36.9  C)  Resp: 16  SpO2: 94 %      Physical Exam  Vitals and nursing note reviewed.   Constitutional:       General: She is not in acute distress.     Appearance: She is not toxic-appearing.   Cardiovascular:      Rate and Rhythm: Normal rate.   Pulmonary:      Effort: Pulmonary effort is normal.   Musculoskeletal:      Left ankle: Swelling present. No tenderness.      Left Achilles Tendon: No tenderness.      Left foot:  Decreased range of motion. Swelling and tenderness present. No deformity.        Feet:    Skin:     General: Skin is warm and dry.   Neurological:      Mental Status: She is alert and oriented to person, place, and time.         ED Course     Results for orders placed or performed during the hospital encounter of 07/25/23 (from the past 24 hour(s))   Foot XR, G/E 3 views, left    Narrative    PROCEDURE:  XR FOOT LEFT G/E 3 VIEWS    HISTORY: tender 5th metatarsal, swelling    COMPARISON:  Radiographs 6/19/2023    TECHNIQUE:  XR FOOT LEFT 3 VIEWS    FINDINGS:   No fracture or dislocation is identified. The joint spaces are  preserved. Calcaneal plantar and Achilles enthesopathy. Tripartite  medial sesamoid of the first MTP joint, likely sequela of prior  injury.    No foreign body is seen.     Soft tissues are within normal limits.        Impression    IMPRESSION:   No acute osseous abnormality.    MIREILLE COOL MD         SYSTEM ID:  [id number]     Medications   acetaminophen (TYLENOL) tablet 975 mg (975 mg Oral $Given 7/25/23 1456)       Assessments & Plan (with Medical Decision Making)     I have reviewed the nursing notes.  I have reviewed the findings, diagnosis, plan and need for follow up with the patient.    Discharge Medication List as of 7/25/2023  2:54 PM          Final diagnoses:   Left ankle tendinitis   No Fx on XR. Tender along peroneals. I recommended RICE and support/ACE/has walking boot at home. Follow up with Primary Care Provider with poor improvement. Seek attention with worsening, redness, fever.      7/25/2023   [facility]       Miguel Angel Arnold PA  07/25/23 2060

## 2023-07-25 NOTE — ED TRIAGE NOTES
Pt reports left ankle pain.  Ongoing for 5 days.  Has been icing and elevated and tylenol.  Pain is getting worse. Can't recall an exact injury. Did hurt the right one about a month ago

## 2023-07-25 NOTE — DISCHARGE INSTRUCTIONS
Bump up prednisone to 10mg today, consider 10 mg tomorrow, then taper.   Try to wait 6 hrs for next round of tylenol. Do not take more than 4000mg in 24 hrs.   Back here with redness, warmth.

## 2023-07-31 ENCOUNTER — HOSPITAL ENCOUNTER (EMERGENCY)
Facility: HOSPITAL | Age: 63
Discharge: HOME | End: 2023-07-31
Payer: COMMERCIAL

## 2023-07-31 VITALS
HEART RATE: 103 BPM | SYSTOLIC BLOOD PRESSURE: 126 MMHG | DIASTOLIC BLOOD PRESSURE: 60 MMHG | RESPIRATION RATE: 18 BRPM | OXYGEN SATURATION: 95 % | TEMPERATURE: 97.4 F

## 2023-07-31 DIAGNOSIS — L03.811 CELLULITIS OF HEAD EXCEPT FACE: ICD-10-CM

## 2023-07-31 DIAGNOSIS — B02.7 DISSEMINATED HERPES ZOSTER: ICD-10-CM

## 2023-07-31 PROCEDURE — G0463 HOSPITAL OUTPT CLINIC VISIT: HCPCS

## 2023-07-31 PROCEDURE — 99213 OFFICE O/P EST LOW 20 MIN: CPT

## 2023-07-31 RX ORDER — SULFAMETHOXAZOLE/TRIMETHOPRIM 800-160 MG
1 TABLET ORAL 2 TIMES DAILY
Qty: 14 TABLET | Refills: 0 | Status: SHIPPED | OUTPATIENT
Start: 2023-07-31 | End: 2023-08-07

## 2023-07-31 RX ORDER — ACYCLOVIR 800 MG/1
800 TABLET ORAL
Qty: 35 TABLET | Refills: 0 | Status: SHIPPED | OUTPATIENT
Start: 2023-07-31 | End: 2023-08-07

## 2023-07-31 ASSESSMENT — ENCOUNTER SYMPTOMS
SHORTNESS OF BREATH: 0
CHILLS: 0
FEVER: 0
EYE REDNESS: 0
FATIGUE: 0

## 2023-07-31 NOTE — DISCHARGE INSTRUCTIONS
Acyclovir 800 mg 5 times daily for 7 days for shingles.  Bactrim twice daily for 7 days for skin infection.  Tylenol and ibuprofen as needed for pain/discomfort.  If symptoms persist or worsen return to urgent care.  Follow-up with primary care in the next 7 days.

## 2023-07-31 NOTE — ED PROVIDER NOTES
History     Chief Complaint   Patient presents with    Rash     HPI  Nevin Eckert is a 63 year old female who presents to urgent care with a 3-day history of painful rash on the left side of her head and neck. Rash and pain has been becoming progressively worse.  Has not used topical or over-the-counter medications.  Denies associated chills, malaise, vision changes, sore throat.  Endorses fevers however did not confirm with thermometer.    Allergies:  Allergies   Allergen Reactions    Aleve [Naproxen] Headache    Imitrex [Sumatriptan] Palpitations    Codeine Other (See Comments) and Itching     SOB    Morphine Other (See Comments)     Low heart rate    Morphine And Related     Penicillins Other (See Comments) and Rash     Swelling       Problem List:    Patient Active Problem List    Diagnosis Date Noted    Tobacco use disorder 06/18/2014     Priority: Medium    Type 2 diabetes, HbA1C goal < 8% (H) 05/13/2013     Priority: Medium    Hypertension goal BP (blood pressure) < 130/80 05/13/2013     Priority: Medium    Hyperlipidemia LDL goal <100 05/13/2013     Priority: Medium        Past Medical History:    Past Medical History:   Diagnosis Date    Absence of menstruation 1/15/2001    Acute sinusitis, unspecified 1/3/2004    Acute upper respiratory infections of unspecified site 11/17/2000    Bronchitis, not specified as acute or chronic 11/20/2000    Care involving other physical therapy 4/17/2000    Chest pain, unspecified 10/12/2000    Diabetes mellitus without mention of complication,  2/4/2003    Migraine, unspecified, without mention of intractable migraine without mention of status migrainosus 12/5/2002    Seronegative rheumatoid arthritis (H) 02/24/2016    Unspecified sinusitis (chronic) 3/28/2000       Past Surgical History:    Past Surgical History:   Procedure Laterality Date    Carpal tunnel syndrome      carpal tunnel release; RT    cholecystectomy      L3-L4 discectomy         Family History:     Family History   Problem Relation Age of Onset    C.A.D. Father     Other - See Comments Brother         Hyperlipidemia    Other - See Comments Mother         Hyperlipidemia/Lupus erythematosus/Osteoarthritis, with lumbar stenosis       Social History:  Marital Status:  Single [1]  Social History     Tobacco Use    Smoking status: Former     Packs/day: 1.00     Years: 30.00     Pack years: 30.00     Types: Cigarettes     Quit date: 10/26/2015     Years since quittin.7    Smokeless tobacco: Never    Tobacco comments:     Tried to quit; longest tobacco free, 3 years; no passive exposure   Substance Use Topics    Alcohol use: No    Drug use: No        Medications:    acyclovir (ZOVIRAX) 800 MG tablet  sulfamethoxazole-trimethoprim (BACTRIM DS) 800-160 MG tablet  albuterol (PROAIR HFA/PROVENTIL HFA/VENTOLIN HFA) 108 (90 Base) MCG/ACT inhaler  aspirin 81 MG tablet  blood glucose (NO BRAND SPECIFIED) lancets standard  blood glucose monitoring (NO BRAND SPECIFIED) meter device kit  blood glucose monitoring (NO BRAND SPECIFIED) test strip  Blood Glucose Monitoring Suppl (ACCU-CHEK COMPLETE) KIT  DULoxetine (CYMBALTA) 60 MG capsule  etanercept (ENBREL) 50 MG/ML injection  FOLIC ACID PO  hydroxychloroquine (PLAQUENIL) 200 MG tablet  lisinopril (PRINIVIL,ZESTRIL) 2.5 MG tablet  PREDNISONE PO  PseudoePHEDrine-guaiFENesin 120-1200 MG TB12          Review of Systems   Constitutional:  Negative for chills, fatigue and fever.   Eyes:  Negative for redness and visual disturbance.   Respiratory:  Negative for shortness of breath.    Skin:  Positive for rash.   All other systems reviewed and are negative.      Physical Exam   BP: 126/60  Pulse: 103  Temp: 97.4  F (36.3  C)  Resp: 18  SpO2: 95 %      Physical Exam  Constitutional:       General: She is not in acute distress.     Appearance: She is not ill-appearing.   HENT:      Right Ear: Tympanic membrane and ear canal normal.      Left Ear: Tympanic membrane and ear canal  normal.      Mouth/Throat:      Mouth: Mucous membranes are moist.      Pharynx: No oropharyngeal exudate or posterior oropharyngeal erythema.   Eyes:      Conjunctiva/sclera: Conjunctivae normal.   Cardiovascular:      Rate and Rhythm: Normal rate and regular rhythm.   Pulmonary:      Effort: Pulmonary effort is normal.   Skin:     General: Skin is warm and dry.      Comments: Erythematous, vesicular, scattered rash from left side of superior scalp along posterior left ear to left side of base of neck and shoulder.  Rash does not cross midline.  Areas of significant surrounding edema, erythema and tenderness.  Some areas of appreciable fluctuance no current bleeding, drainage, discharge.  No rash visible surrounding periorbital region.   Neurological:      Mental Status: She is alert.         ED Course                 Procedures             Critical Care time:               No results found for this or any previous visit (from the past 24 hour(s)).    Medications - No data to display    Assessments & Plan (with Medical Decision Making)     History and physical exam most consistent with shingles rash with concurrent cellulitis.  She does qualify as immunocompromised due to underlying rheumatologic conditions and chronic prednisone use.  Exam does not support contact dermatitis, psoriasis.  Some areas of fluctuance however not consistent with abscess.  No evidence of ophthalmic shingles.  Considering immunocompromise state, plan is to treat with acyclovir 800 mg 5 times daily for 7 days, even though her onset of symptoms was greater than 72 hours. Bactrim twice daily for 7 days for suspected cellulitis including coverage for MRSA.  Recommend following up with primary care within 7 days.  If symptoms persist or worsen return to urgent care.    I have reviewed the nursing notes.    I have reviewed the findings, diagnosis, plan and need for follow up with the patient.          Medical Decision Making  The patient's  presentation was of .    The patient's evaluation involved:      The patient's management necessitated         Discharge Medication List as of 7/31/2023 10:09 AM        START taking these medications    Details   acyclovir (ZOVIRAX) 800 MG tablet Take 1 tablet (800 mg) by mouth 5 times daily for 7 days, Disp-35 tablet, R-0, E-Prescribe      sulfamethoxazole-trimethoprim (BACTRIM DS) 800-160 MG tablet Take 1 tablet by mouth 2 times daily for 7 days, Disp-14 tablet, R-0, E-Prescribe             Final diagnoses:   Disseminated herpes zoster   Cellulitis of head except face       7/31/2023   [facility]

## 2023-07-31 NOTE — ED TRIAGE NOTES
Patient presents to urgent care for rash left side of neck, causing her to have ear pain, stiff neck,  cluster of rash in her head. Rash has been present for 3 days patient reports it's been spreading.  Generalized body aches x1 week.

## 2023-07-31 NOTE — ED TRIAGE NOTES
Patient presents with c/o rash on left side of neck, causing pain in ear, and pain down her neck. Rash has been present for 3 days. Patient reports generalized body aches X1 week.

## 2024-03-01 DIAGNOSIS — M06.09 RHEUMATOID ARTHRITIS OF MULTIPLE SITES WITHOUT RHEUMATOID FACTOR (H): Primary | ICD-10-CM

## 2024-03-01 DIAGNOSIS — Z79.899 HISTORY OF LONG-TERM TREATMENT WITH HIGH-RISK MEDICATION: ICD-10-CM

## 2024-04-26 ENCOUNTER — TRANSFERRED RECORDS (OUTPATIENT)
Dept: HEALTH INFORMATION MANAGEMENT | Facility: HOSPITAL | Age: 64
End: 2024-04-26

## 2024-04-26 LAB
ALBUMIN (URINE) MG/SPEC: <3 MG/L
CREATININE (URINE): 41 MG/DL

## 2024-04-30 ENCOUNTER — MEDICAL CORRESPONDENCE (OUTPATIENT)
Dept: CT IMAGING | Facility: HOSPITAL | Age: 64
End: 2024-04-30

## 2024-05-02 ENCOUNTER — HOSPITAL ENCOUNTER (OUTPATIENT)
Dept: CT IMAGING | Facility: HOSPITAL | Age: 64
Discharge: HOME | End: 2024-05-02
Attending: NURSE PRACTITIONER | Admitting: NURSE PRACTITIONER
Payer: MEDICARE

## 2024-05-02 DIAGNOSIS — F17.210 CIGARETTE SMOKER: ICD-10-CM

## 2024-05-02 PROCEDURE — 71271 CT THORAX LUNG CANCER SCR C-: CPT

## 2024-10-29 ENCOUNTER — TRANSFERRED RECORDS (OUTPATIENT)
Dept: HEALTH INFORMATION MANAGEMENT | Facility: HOSPITAL | Age: 64
End: 2024-10-29

## 2024-10-29 LAB
HBA1C MFR BLD: 5.9 %
HEP C HIM: NORMAL

## 2024-11-19 ENCOUNTER — TRANSFERRED RECORDS (OUTPATIENT)
Dept: HEALTH INFORMATION MANAGEMENT | Facility: HOSPITAL | Age: 64
End: 2024-11-19

## 2024-11-19 ENCOUNTER — TRANSFERRED RECORDS (OUTPATIENT)
Dept: HEALTH INFORMATION MANAGEMENT | Facility: CLINIC | Age: 64
End: 2024-11-19

## 2024-11-19 LAB — HPV ABSTRACT: NORMAL

## 2025-02-04 ENCOUNTER — OFFICE VISIT (OUTPATIENT)
Dept: OBGYN | Facility: OTHER | Age: 65
End: 2025-02-04
Attending: OBSTETRICS & GYNECOLOGY
Payer: MEDICARE

## 2025-02-04 VITALS
SYSTOLIC BLOOD PRESSURE: 122 MMHG | HEART RATE: 80 BPM | HEIGHT: 65 IN | RESPIRATION RATE: 18 BRPM | BODY MASS INDEX: 41.65 KG/M2 | DIASTOLIC BLOOD PRESSURE: 70 MMHG | WEIGHT: 250 LBS

## 2025-02-04 DIAGNOSIS — R87.612 LOW GRADE SQUAMOUS INTRAEPITHELIAL LESION ON CYTOLOGIC SMEAR OF CERVIX (LGSIL): Primary | ICD-10-CM

## 2025-02-04 PROCEDURE — 88305 TISSUE EXAM BY PATHOLOGIST: CPT | Mod: 26 | Performed by: PATHOLOGY

## 2025-02-04 PROCEDURE — 57421 EXAM/BIOPSY OF VAG W/SCOPE: CPT | Performed by: OBSTETRICS & GYNECOLOGY

## 2025-02-04 PROCEDURE — 88342 IMHCHEM/IMCYTCHM 1ST ANTB: CPT | Mod: 26 | Performed by: PATHOLOGY

## 2025-02-04 PROCEDURE — G0463 HOSPITAL OUTPT CLINIC VISIT: HCPCS | Mod: 25

## 2025-02-04 RX ORDER — IODINE AND POTASSIUM IODIDE 50; 100 MG/ML; MG/ML
LIQUID ORAL ONCE
Status: DISPENSED | OUTPATIENT
Start: 2025-02-04

## 2025-02-04 ASSESSMENT — PAIN SCALES - GENERAL: PAINLEVEL_OUTOF10: MILD PAIN (2)

## 2025-02-05 NOTE — PROGRESS NOTES
"Nevin Eckert is a 64 year old female P2 (vag)   who presents for abnormal pap smear evaluation referred by Tonia Cagle NP.     Pap smear 1 months ago showed: LGSIL. The prior pap showed normal.   This will be her initial colposcopy.  HPV not done.  She also complains of mixed incontinence, despite Kegel exercises.     No LMP recorded. Patient is postmenopausal.     Past GYN history:  No STD history  Prior cervical/vaginal disease: Normal exam without visible pathology.  Prior cervical treatment: no treatment.  Tobacco use:Yes    PMH, Fam Hx, Soc Hx Reviewed.    ROS:  Neg GI/    PROCEDURE:  Before the procedure, it was ensured that the patient was educated regarding the nature of her findings to date, the implications, and what was to be done. She has been made aware of the role of HPV, the natural history of infection, ways to minimize her future risk, the effect of HPV on the cervix, and treatment options available should they be indicated.  The details of the colposcopic procedure were reviewed.  All questions were answered before proceeding, and informed consent was therefore obtained.    Speculum placed in vagina and excellent visualization of cervix   acheived, cervix swabbed x 3 with acetic acid solution.    FINDINGS:  EXAM:  Blood pressure 122/70, pulse 80, resp. rate 18, height 1.651 m (5' 5\"), weight 113.4 kg (250 lb).  BMI= Body mass index is 41.6 kg/m .  No LMP recorded. Patient is postmenopausal.  General - pleasant female in no acute distress.  Neurological - alert and oriented X 3  Psychiatric - normal mood and affect    Pelvic-  Mild pelvic relaxation.  Grade 1 cystocele.  + supine stress test.   Cervix: acetowhite area from 10:00 to 1:00 and acetowhite area(s) at 4:00 and 7:00.  Biopsies done at 11, 4 and 7:00.      Pap repeated?:  Yes for HPV only  SCJ seen?:  yes    ECC done?:  No  Lugol's solution used?:  Yes   Satisfactory examination?:  yes      ASSESSMENT: ANDREY 1 to ANDREY 2.    PLAN: " specimens labelled and sent to Pathology, will base further treatment on Pathology findings, treatment options discussed with patient, post biopsy instructions given to patient, and call to discuss Pathology results    Discussed cervical dysplasia/HPV, importance of follow up.  Handouts and my card and phone number given to patient.  She will call if she has not heard results within 2 weeks.    F/up appt scheduled to evaluate and discuss urinary incontinence)     Aristeo Sadler MD

## 2025-05-06 ENCOUNTER — OFFICE VISIT (OUTPATIENT)
Dept: OBGYN | Facility: OTHER | Age: 65
End: 2025-05-06
Attending: OBSTETRICS & GYNECOLOGY
Payer: MEDICARE

## 2025-05-06 VITALS
SYSTOLIC BLOOD PRESSURE: 130 MMHG | DIASTOLIC BLOOD PRESSURE: 78 MMHG | OXYGEN SATURATION: 94 % | HEIGHT: 65 IN | HEART RATE: 99 BPM | BODY MASS INDEX: 41.65 KG/M2 | WEIGHT: 250 LBS

## 2025-05-06 DIAGNOSIS — N39.41 URGE INCONTINENCE OF URINE: Primary | ICD-10-CM

## 2025-05-06 DIAGNOSIS — N39.46 MIXED STRESS AND URGE URINARY INCONTINENCE: ICD-10-CM

## 2025-05-06 LAB
ALBUMIN UR-MCNC: NEGATIVE MG/DL
APPEARANCE UR: CLEAR
BILIRUB UR QL STRIP: NEGATIVE
COLOR UR AUTO: NORMAL
GLUCOSE UR STRIP-MCNC: NEGATIVE MG/DL
HGB UR QL STRIP: NEGATIVE
KETONES UR STRIP-MCNC: NEGATIVE MG/DL
LEUKOCYTE ESTERASE UR QL STRIP: NEGATIVE
NITRATE UR QL: NEGATIVE
PH UR STRIP: 5.5 [PH] (ref 4.7–8)
RBC URINE: 1 /HPF
SP GR UR STRIP: 1.01 (ref 1–1.03)
SQUAMOUS EPITHELIAL: 0 /HPF
UROBILINOGEN UR STRIP-MCNC: NORMAL MG/DL
WBC URINE: <1 /HPF

## 2025-05-06 PROCEDURE — 81003 URINALYSIS AUTO W/O SCOPE: CPT | Mod: ZL | Performed by: OBSTETRICS & GYNECOLOGY

## 2025-05-06 PROCEDURE — G0463 HOSPITAL OUTPT CLINIC VISIT: HCPCS | Mod: 25

## 2025-05-06 RX ORDER — EXENATIDE 2 MG/.85ML
2 INJECTION, SUSPENSION, EXTENDED RELEASE SUBCUTANEOUS
COMMUNITY
Start: 2025-04-21

## 2025-05-06 RX ORDER — TOLTERODINE 4 MG/1
4 CAPSULE, EXTENDED RELEASE ORAL DAILY
Qty: 90 CAPSULE | Refills: 3 | Status: SHIPPED | OUTPATIENT
Start: 2025-05-06

## 2025-05-06 RX ORDER — UPADACITINIB 15 MG/1
15 TABLET, EXTENDED RELEASE ORAL DAILY
COMMUNITY
Start: 2025-04-22

## 2025-05-06 ASSESSMENT — PAIN SCALES - GENERAL: PAINLEVEL_OUTOF10: MILD PAIN (2)

## 2025-05-06 NOTE — PROGRESS NOTES
Patient voided.  Pt collected CVMS and specimen sent to lab.  Writer bladder scanned patient for 37ml post residual void amount in bladder.

## 2025-05-07 NOTE — PROGRESS NOTES
CC: urinary incontinence  HPI:  Nevin Eckert is a 65 year old female P2 (vag) with longstanding mixed urinary incontinence symptoms. .         Frequency: Yes  Urgency:  Yes  Stress:  Yes  Nocturia:  Yes, x 3  Previous work-up:No  Pelvic pain:No  Incomplete emptying:  :No    Pelvic pressure:  No  Dysuria: No  Bulging:  No  Splinting: No  Constipation:  No  Pad use.  Yes    Past GYN history:        Last PAP smear:  LSIL (colpo done)  Hysterectomy: No    Patients records are available and reviewed at today's visit.    Past Medical History:   Diagnosis Date    Absence of menstruation 1/15/2001    Acute sinusitis, unspecified 1/3/2004    Acute upper respiratory infections of unspecified site 11/17/2000    Bronchitis, not specified as acute or chronic 11/20/2000    Care involving other physical therapy 4/17/2000    Chest pain, unspecified 10/12/2000    Diabetes mellitus without mention of complication,  2/4/2003    Migraine, unspecified, without mention of intractable migraine without mention of status migrainosus 12/5/2002    Seronegative rheumatoid arthritis (H) 02/24/2016    Dr Hooker    Unspecified sinusitis (chronic) 3/28/2000       Past Surgical History:   Procedure Laterality Date    Carpal tunnel syndrome      carpal tunnel release; RT    cholecystectomy      L3-L4 discectomy         Family History   Problem Relation Age of Onset    C.A.D. Father     Other - See Comments Brother         Hyperlipidemia    Other - See Comments Mother         Hyperlipidemia/Lupus erythematosus/Osteoarthritis, with lumbar stenosis       Current Outpatient Medications   Medication Sig Dispense Refill    albuterol (PROAIR HFA/PROVENTIL HFA/VENTOLIN HFA) 108 (90 Base) MCG/ACT inhaler Inhale 2 puffs into the lungs every 6 hours as needed for shortness of breath / dyspnea or wheezing 8.5 g 0    BYDUREON BCISE 2 MG/0.85ML auto-injector Inject 2 mg subcutaneously every 7 days.      DULoxetine (CYMBALTA) 60 MG capsule Take 60 mg by mouth  "daily      hydroxychloroquine (PLAQUENIL) 200 MG tablet Take 200 mg by mouth 2 times daily      PREDNISONE PO Take 5 mg by mouth daily      PseudoePHEDrine-guaiFENesin 120-1200 MG TB12 Take 1 tablet by mouth 2 times daily 28 tablet 0    RINVOQ 15 MG tablet Take 15 mg by mouth daily.      tolterodine ER (DETROL LA) 4 MG 24 hr capsule Take 1 capsule (4 mg) by mouth daily. 90 capsule 3    acyclovir (ZOVIRAX) 800 MG tablet Take 1 tablet (800 mg) by mouth 5 times daily for 7 days 35 tablet 0    aspirin 81 MG tablet Take  by mouth daily. (Patient not taking: Reported on 5/6/2025)      blood glucose (NO BRAND SPECIFIED) lancets standard Use to test blood sugar 2 times daily or as directed. (Patient not taking: Reported on 5/6/2025) 1 Box 5    blood glucose monitoring (NO BRAND SPECIFIED) meter device kit Use to test blood sugar 2 times daily or as directed. (Patient not taking: Reported on 5/6/2025) 1 kit 0    blood glucose monitoring (NO BRAND SPECIFIED) test strip Use to test blood sugars 2 times daily or as directed (Patient not taking: Reported on 5/6/2025) 100 each 5    Blood Glucose Monitoring Suppl (ACCU-CHEK COMPLETE) KIT 1 Device 2 times daily (Patient not taking: Reported on 5/6/2025) 60 strip 12    etanercept (ENBREL) 50 MG/ML injection Inject 50 mg Subcutaneous once a week      FOLIC ACID PO Take 1 mg by mouth daily (Patient not taking: Reported on 5/6/2025)      lisinopril (PRINIVIL,ZESTRIL) 2.5 MG tablet Take 1 tablet (2.5 mg) by mouth daily 30 tablet 12       Allergies: Aleve [naproxen], Imitrex [sumatriptan], Codeine, Morphine, Morphine and codeine, and Penicillins    ROS:  CONSTITUTIONAL: NEGATIVE for fever, chills, change in weight  RESP: NEGATIVE for significant cough or SOB  CV: NEGATIVE for chest pain, palpitations or peripheral edema  GI: NEGATIVE except for above  : As Above  PSYCHIATRIC: NEGATIVE for changes in mood or affect    EXAM:  Blood pressure 130/78, pulse 99, height 1.651 m (5' 5\"), " weight 113.4 kg (250 lb), SpO2 94%.   BMI= Body mass index is 41.6 kg/m .  General - pleasant female in no acute distress.  Abdomen - soft, nontender, nondistended, no hepatosplenomegaly.  Pelvic - EG: normal adult female, BUS: within normal limits, Vagina: well rugated, no discharge, Cervix: no lesions or CMT, Uterus: firm,  normal sized and nontender, Adnexae: no masses or tenderness.  Prolapse:  Uterus grade 0, cystocele 1, rectocele 1   Urethral hypermobility:  Yes    Rectovaginal - deferred.  Musculoskeletal - no gross deformities or edema  Neurological - normal mental status.    Supine stress test:  positive  Post-void residual 37cc      ASSESSMENT/PLAN:  Mixed/predominant Urge incontinence of urine  (primary encounter diagnosis)  Plan: UA with Microscopic reflex to Culture -         [place], tolterodine ER (DETROL LA) 4 MG 24 hr         Capsule.  R/B/SE's discussed.           Plan: Physical Therapy  Referral          Handouts were reviewed with patient. Patient has my card and phone number if questions.  F/up 3 months if no significant improvement, may be a candidate for sling procedure for stress UI but did discuss increased potential surgical complications/failure with current weight, diabetes, steroid use etc .   Pt has my card and phone number to call as needed if problems in the interim. 35  minutes were spent on the day of the encounter, outside of a procedure,  doing face-to-face counseling, review or records, charting,  and coordination of care

## 2025-05-19 ENCOUNTER — TELEPHONE (OUTPATIENT)
Dept: FAMILY MEDICINE | Facility: OTHER | Age: 65
End: 2025-05-19

## 2025-05-19 DIAGNOSIS — E11.9 TYPE 2 DIABETES, HBA1C GOAL < 8% (H): Primary | ICD-10-CM

## 2025-05-19 NOTE — TELEPHONE ENCOUNTER
Called patient and updated her that she does not need labs completed until August.  Patient schedule for an appt on 8/11 with fasting labs to be completed prior.  Please order labwork.  Patient also requests an alternative be sent in for [employer].  Patient is requesting Mounjaro to be sent to [facility]

## 2025-05-19 NOTE — TELEPHONE ENCOUNTER
2:30 PM    Reason for Call: Phone Call    Description: pt called and would like NP Teena to put in orders for her DM check up sent to [facility] fax [phone]    Was an appointment offered for this call? No  If yes : Appointment type              Date    Preferred method for responding to this message: Telephone Call  What is your phone number ? [phone]    If we cannot reach you directly, may we leave a detailed response at the number you provided? Yes    Can this message wait until your PCP/provider returns, if available today?  Not applicable    Christina Bruce

## 2025-05-19 NOTE — TELEPHONE ENCOUNTER
Patient states that Bydureon is out of stock most places and they are going to discontinue making this medication.  Patient asked to be switched to Mounjaro.  Patient has an appointment scheduled in August but cannot get Bydureon at all.

## 2025-05-20 NOTE — TELEPHONE ENCOUNTER
Called patient to discuss this requested medication change.  The patient states that the pharmacy recommended Mounjaro due to the issues they have been having obtaining Ozempic.  Patient took her last dose of Bydureon yesterday, she takes it once per week.  Patient is scheduled to see you on 6/2 at 9 am but would like to know what she should do in the interim regarding her diabetic medication.  Please advise

## 2025-05-20 NOTE — TELEPHONE ENCOUNTER
Please schedule patient an appt on 6/2 at 9 am with Tonia Dickinson; patient already notified and agreed to appt

## 2025-05-22 ENCOUNTER — TELEPHONE (OUTPATIENT)
Dept: FAMILY MEDICINE | Facility: OTHER | Age: 65
End: 2025-05-22

## 2025-05-22 NOTE — TELEPHONE ENCOUNTER
PA Determination was received for Mounjaro 2.5MG/0.5ML auto-injectors, this has been APPROVED (5/20/25-5/20/26). See below:

## 2025-05-22 NOTE — TELEPHONE ENCOUNTER
PA request received from [facility] for Mounjaro 2.5MG/0.5ML auto-injectors. This has been submitted via [facility], pending determination.

## 2025-06-02 ENCOUNTER — RESULTS FOLLOW-UP (OUTPATIENT)
Dept: FAMILY MEDICINE | Facility: OTHER | Age: 65
End: 2025-06-02

## 2025-06-02 ENCOUNTER — OFFICE VISIT (OUTPATIENT)
Dept: FAMILY MEDICINE | Facility: OTHER | Age: 65
End: 2025-06-02
Attending: NURSE PRACTITIONER
Payer: MEDICARE

## 2025-06-02 VITALS
OXYGEN SATURATION: 97 % | TEMPERATURE: 97.3 F | SYSTOLIC BLOOD PRESSURE: 120 MMHG | HEIGHT: 65 IN | BODY MASS INDEX: 41.07 KG/M2 | RESPIRATION RATE: 18 BRPM | WEIGHT: 246.5 LBS | DIASTOLIC BLOOD PRESSURE: 82 MMHG | HEART RATE: 86 BPM

## 2025-06-02 DIAGNOSIS — Z79.899 ENCOUNTER FOR LONG-TERM (CURRENT) USE OF MEDICATIONS: ICD-10-CM

## 2025-06-02 DIAGNOSIS — E78.1 HYPERTRIGLYCERIDEMIA WITHOUT HYPERCHOLESTEROLEMIA: ICD-10-CM

## 2025-06-02 DIAGNOSIS — E66.813 CLASS 3 SEVERE OBESITY DUE TO EXCESS CALORIES WITH SERIOUS COMORBIDITY AND BODY MASS INDEX (BMI) OF 40.0 TO 44.9 IN ADULT (H): ICD-10-CM

## 2025-06-02 DIAGNOSIS — E11.9 TYPE 2 DIABETES MELLITUS WITHOUT COMPLICATION, WITHOUT LONG-TERM CURRENT USE OF INSULIN (H): Primary | ICD-10-CM

## 2025-06-02 DIAGNOSIS — Z53.20 STATIN DECLINED: ICD-10-CM

## 2025-06-02 DIAGNOSIS — E55.9 VITAMIN D INSUFFICIENCY: ICD-10-CM

## 2025-06-02 PROBLEM — G89.29 CHRONIC BILATERAL LOW BACK PAIN WITH BILATERAL SCIATICA: Status: ACTIVE | Noted: 2025-05-23

## 2025-06-02 PROBLEM — E04.2 MULTINODULAR THYROID: Status: ACTIVE | Noted: 2025-05-23

## 2025-06-02 PROBLEM — M70.51 BURSITIS OF RIGHT KNEE: Status: ACTIVE | Noted: 2025-05-23

## 2025-06-02 PROBLEM — R79.82 CRP ELEVATED: Status: ACTIVE | Noted: 2018-08-27

## 2025-06-02 PROBLEM — H52.13 MYOPIA OF BOTH EYES WITH ASTIGMATISM AND PRESBYOPIA: Status: ACTIVE | Noted: 2025-05-23

## 2025-06-02 PROBLEM — I10 ESSENTIAL HYPERTENSION: Status: RESOLVED | Noted: 2025-05-23 | Resolved: 2025-06-02

## 2025-06-02 PROBLEM — N39.46 MIXED STRESS AND URGE URINARY INCONTINENCE: Status: ACTIVE | Noted: 2025-05-23

## 2025-06-02 PROBLEM — M48.061 LUMBAR SPINAL STENOSIS: Status: ACTIVE | Noted: 2025-05-23

## 2025-06-02 PROBLEM — H04.123 DRY EYE SYNDROME OF BOTH EYES: Status: ACTIVE | Noted: 2025-05-23

## 2025-06-02 PROBLEM — J30.9 ALLERGIC RHINITIS: Status: ACTIVE | Noted: 2017-10-02

## 2025-06-02 PROBLEM — I10 ESSENTIAL HYPERTENSION: Status: ACTIVE | Noted: 2025-05-23

## 2025-06-02 PROBLEM — H52.4 MYOPIA OF BOTH EYES WITH ASTIGMATISM AND PRESBYOPIA: Status: ACTIVE | Noted: 2025-05-23

## 2025-06-02 PROBLEM — J32.1 SINUSITIS CHRONIC, FRONTAL: Status: ACTIVE | Noted: 2017-12-14

## 2025-06-02 PROBLEM — R91.8 LUNG NODULES: Status: ACTIVE | Noted: 2025-05-23

## 2025-06-02 PROBLEM — H25.093 AGE-RELATED INCIPIENT CATARACT OF BOTH EYES: Status: ACTIVE | Noted: 2017-10-02

## 2025-06-02 PROBLEM — M21.40 PES PLANUS: Status: ACTIVE | Noted: 2017-12-14

## 2025-06-02 PROBLEM — M06.09 RHEUMATOID ARTHRITIS WITHOUT RHEUMATOID FACTOR, MULTIPLE SITES (H): Status: ACTIVE | Noted: 2025-05-23

## 2025-06-02 PROBLEM — N81.10 BADEN-WALKER GRADE 1 CYSTOCELE: Status: ACTIVE | Noted: 2025-05-23

## 2025-06-02 PROBLEM — E04.1 SOLITARY NODULE OF RIGHT LOBE OF THYROID: Status: RESOLVED | Noted: 2025-05-23 | Resolved: 2025-06-02

## 2025-06-02 PROBLEM — R87.612 LGSIL ON PAP SMEAR OF CERVIX: Status: ACTIVE | Noted: 2025-05-23

## 2025-06-02 PROBLEM — G47.33 OBSTRUCTIVE SLEEP APNEA OF ADULT: Status: ACTIVE | Noted: 2018-08-27

## 2025-06-02 PROBLEM — M51.369 BULGING OF LUMBAR INTERVERTEBRAL DISC: Status: ACTIVE | Noted: 2025-05-23

## 2025-06-02 PROBLEM — H52.203 MYOPIA OF BOTH EYES WITH ASTIGMATISM AND PRESBYOPIA: Status: ACTIVE | Noted: 2025-05-23

## 2025-06-02 PROBLEM — I49.3 PVCS (PREMATURE VENTRICULAR CONTRACTIONS): Status: ACTIVE | Noted: 2018-08-27

## 2025-06-02 PROBLEM — R53.83 FATIGUE: Status: ACTIVE | Noted: 2017-12-07

## 2025-06-02 PROBLEM — M47.816 DJD (DEGENERATIVE JOINT DISEASE), LUMBAR: Status: ACTIVE | Noted: 2025-05-23

## 2025-06-02 PROBLEM — F17.210 CIGARETTE SMOKER: Status: ACTIVE | Noted: 2025-05-23

## 2025-06-02 PROBLEM — M54.41 CHRONIC BILATERAL LOW BACK PAIN WITH BILATERAL SCIATICA: Status: ACTIVE | Noted: 2025-05-23

## 2025-06-02 PROBLEM — M54.42 CHRONIC BILATERAL LOW BACK PAIN WITH BILATERAL SCIATICA: Status: ACTIVE | Noted: 2025-05-23

## 2025-06-02 PROBLEM — F43.23 SITUATIONAL MIXED ANXIETY AND DEPRESSIVE DISORDER: Status: ACTIVE | Noted: 2025-05-23

## 2025-06-02 PROBLEM — E05.90 SUBCLINICAL HYPERTHYROIDISM: Status: ACTIVE | Noted: 2025-05-23

## 2025-06-02 LAB
ALBUMIN UR-MCNC: NEGATIVE MG/DL
ANION GAP SERPL CALCULATED.3IONS-SCNC: 12 MMOL/L (ref 7–15)
APPEARANCE UR: CLEAR
BILIRUB UR QL STRIP: NEGATIVE
BUN SERPL-MCNC: 13.1 MG/DL (ref 8–23)
CALCIUM SERPL-MCNC: 9.3 MG/DL (ref 8.8–10.4)
CHLORIDE SERPL-SCNC: 104 MMOL/L (ref 98–107)
CHOLEST SERPL-MCNC: 159 MG/DL
COLOR UR AUTO: NORMAL
CREAT SERPL-MCNC: 0.99 MG/DL (ref 0.51–0.95)
CREAT UR-MCNC: 27 MG/DL
EGFRCR SERPLBLD CKD-EPI 2021: 64 ML/MIN/1.73M2
EST. AVERAGE GLUCOSE BLD GHB EST-MCNC: 117 MG/DL
FASTING STATUS PATIENT QL REPORTED: YES
FASTING STATUS PATIENT QL REPORTED: YES
GLUCOSE SERPL-MCNC: 90 MG/DL (ref 70–99)
GLUCOSE UR STRIP-MCNC: NEGATIVE MG/DL
HBA1C MFR BLD: 5.7 %
HCO3 SERPL-SCNC: 26 MMOL/L (ref 22–29)
HDLC SERPL-MCNC: 57 MG/DL
HGB UR QL STRIP: NEGATIVE
KETONES UR STRIP-MCNC: NEGATIVE MG/DL
LDLC SERPL CALC-MCNC: 80 MG/DL
LEUKOCYTE ESTERASE UR QL STRIP: NEGATIVE
MICROALBUMIN UR-MCNC: <12 MG/L
MICROALBUMIN/CREAT UR: NORMAL MG/G{CREAT}
NITRATE UR QL: NEGATIVE
NONHDLC SERPL-MCNC: 102 MG/DL
PH UR STRIP: 6.5 [PH] (ref 4.7–8)
POTASSIUM SERPL-SCNC: 5.1 MMOL/L (ref 3.4–5.3)
RBC URINE: <1 /HPF
SODIUM SERPL-SCNC: 142 MMOL/L (ref 135–145)
SP GR UR STRIP: 1 (ref 1–1.03)
SQUAMOUS EPITHELIAL: 0 /HPF
TRIGL SERPL-MCNC: 111 MG/DL
UROBILINOGEN UR STRIP-MCNC: NORMAL MG/DL
WBC URINE: 0 /HPF

## 2025-06-02 PROCEDURE — 83036 HEMOGLOBIN GLYCOSYLATED A1C: CPT | Mod: ZL | Performed by: NURSE PRACTITIONER

## 2025-06-02 PROCEDURE — G2211 COMPLEX E/M VISIT ADD ON: HCPCS | Performed by: NURSE PRACTITIONER

## 2025-06-02 PROCEDURE — 83718 ASSAY OF LIPOPROTEIN: CPT | Mod: ZL | Performed by: NURSE PRACTITIONER

## 2025-06-02 PROCEDURE — 81001 URINALYSIS AUTO W/SCOPE: CPT | Mod: ZL | Performed by: NURSE PRACTITIONER

## 2025-06-02 PROCEDURE — 36415 COLL VENOUS BLD VENIPUNCTURE: CPT | Mod: ZL | Performed by: NURSE PRACTITIONER

## 2025-06-02 PROCEDURE — 3079F DIAST BP 80-89 MM HG: CPT | Performed by: NURSE PRACTITIONER

## 2025-06-02 PROCEDURE — 82435 ASSAY OF BLOOD CHLORIDE: CPT | Mod: ZL | Performed by: NURSE PRACTITIONER

## 2025-06-02 PROCEDURE — 99214 OFFICE O/P EST MOD 30 MIN: CPT | Performed by: NURSE PRACTITIONER

## 2025-06-02 PROCEDURE — G0463 HOSPITAL OUTPT CLINIC VISIT: HCPCS

## 2025-06-02 PROCEDURE — 82565 ASSAY OF CREATININE: CPT | Mod: ZL | Performed by: NURSE PRACTITIONER

## 2025-06-02 PROCEDURE — 3048F LDL-C <100 MG/DL: CPT | Performed by: NURSE PRACTITIONER

## 2025-06-02 PROCEDURE — 1125F AMNT PAIN NOTED PAIN PRSNT: CPT | Performed by: NURSE PRACTITIONER

## 2025-06-02 PROCEDURE — 3044F HG A1C LEVEL LT 7.0%: CPT | Performed by: NURSE PRACTITIONER

## 2025-06-02 PROCEDURE — 82570 ASSAY OF URINE CREATININE: CPT | Mod: ZL | Performed by: NURSE PRACTITIONER

## 2025-06-02 PROCEDURE — 3074F SYST BP LT 130 MM HG: CPT | Performed by: NURSE PRACTITIONER

## 2025-06-02 RX ORDER — ASPIRIN 81 MG/1
81 TABLET ORAL DAILY
COMMUNITY

## 2025-06-02 RX ORDER — LANOLIN ALCOHOL/MO/W.PET/CERES
1000 CREAM (GRAM) TOPICAL DAILY
Qty: 90 TABLET | Refills: 3 | Status: SHIPPED | OUTPATIENT
Start: 2025-06-02

## 2025-06-02 RX ORDER — IPRATROPIUM BROMIDE AND ALBUTEROL SULFATE 2.5; .5 MG/3ML; MG/3ML
3 SOLUTION RESPIRATORY (INHALATION)
COMMUNITY
Start: 2025-03-12

## 2025-06-02 RX ORDER — CLINDAMYCIN PHOSPHATE 10 UG/ML
LOTION TOPICAL
COMMUNITY
Start: 2025-05-01

## 2025-06-02 RX ORDER — ACYCLOVIR 400 MG/1
1 TABLET ORAL
Qty: 9 EACH | Refills: 5 | Status: SHIPPED | OUTPATIENT
Start: 2025-06-02

## 2025-06-02 RX ORDER — ERGOCALCIFEROL 1.25 MG/1
50000 CAPSULE, LIQUID FILLED ORAL WEEKLY
Qty: 12 CAPSULE | Refills: 3 | Status: SHIPPED | OUTPATIENT
Start: 2025-06-02

## 2025-06-02 RX ORDER — GUAIFENESIN AND PSEUDOEPHEDRINE HCL 1200; 120 MG/1; MG/1
1 TABLET, EXTENDED RELEASE ORAL DAILY PRN
COMMUNITY
Start: 2025-06-02

## 2025-06-02 RX ORDER — CETIRIZINE HYDROCHLORIDE 10 MG/1
10 TABLET ORAL DAILY
COMMUNITY
Start: 2024-11-19

## 2025-06-02 RX ORDER — PERMETHRIN 50 MG/G
CREAM TOPICAL
COMMUNITY
Start: 2025-05-01

## 2025-06-02 RX ORDER — ACYCLOVIR 400 MG/1
1 TABLET ORAL ONCE
Qty: 1 EACH | Refills: 0 | Status: SHIPPED | OUTPATIENT
Start: 2025-06-02 | End: 2025-06-02

## 2025-06-02 ASSESSMENT — PAIN SCALES - GENERAL: PAINLEVEL_OUTOF10: MILD PAIN (2)

## 2025-06-02 NOTE — PROGRESS NOTES
"  Assessment & Plan     (E11.9) Type 2 diabetes mellitus without complication, without long-term current use of insulin (H)  (primary encounter diagnosis)  Comment: ***  Plan: Hemoglobin A1c, Basic metabolic panel, UA         Macroscopic with reflex to Microscopic and         Culture, Albumin Random Urine Quantitative with        Creat Ratio, Continuous Glucose          (DEXCOM G7 ) RONAN, Continuous Glucose         Sensor (DEXCOM G7 SENSOR) MISC    (E78.1) Hypertriglyceridemia without hypercholesterolemia  Comment: ***  Plan: Lipid Profile    (E55.9) Vitamin D insufficiency  Comment: ***  Plan: vitamin D2 (ERGOCALCIFEROL) 34840 units (1250         mcg) capsule    (Z79.899) Encounter for long-term (current) use of medications  Comment: ***  Plan: cyanocobalamin (VITAMIN B-12) 1000 MCG tablet     Mounjaro started on 5/27/2025, Bydureon not available  Dexcom ordered today      BMI  Estimated body mass index is 41.02 kg/m  as calculated from the following:    Height as of this encounter: 1.651 m (5' 5\").    Weight as of this encounter: 111.8 kg (246 lb 8 oz).   Weight management plan: Discussed healthy diet and exercise guidelines taking Mounjaro  She already changed diet with less carbs, less bread    {Follow-up (Optional):727225}    Brody Rooney is a 65 year old, presenting for the following health issues:  Lipids and Diabetes        6/2/2025     8:45 AM   Additional Questions   Roomed by Nella DUARTE   Accompanied by self         6/2/2025     8:45 AM   Patient Reported Additional Medications   Patient reports taking the following new medications none     HPI      Diabetes Follow-up    How often are you checking your blood sugar? Not at all  What concerns do you have today about your diabetes? Low blood sugar   Do you have any of these symptoms? (Select all that apply)  No numbness or tingling in feet.  No redness, sores or blisters on feet.  No complaints of excessive thirst.  No reports of blurry " "vision.  No significant changes to weight.  Have you had a diabetic eye exam in the last 12 months? No        {Reference  Diabetes Management Resources  Blood Glucose Log - 3 weeks  Blood Glucose Log with Food and Insulin Record :502639}    Hyperlipidemia Follow-Up    Are you regularly taking any medication or supplement to lower your cholesterol?   No  Are you having muscle aches or other side effects that you think could be caused by your cholesterol lowering medication?  No      Hemoglobin A1C (%)   Date Value   06/24/2015 5.9   06/25/2014 6.1     LDL Cholesterol Calculated (mg/dL)   Date Value   06/24/2015 74   12/24/2013 90       {additonal problems for provider to add (Optional):344438}    {ROS Picklists (Optional):738941}      Objective    /82 (BP Location: Left arm, Patient Position: Sitting, Cuff Size: Adult Large)   Pulse 86   Temp 97.3  F (36.3  C) (Tympanic)   Resp 18   Ht 1.651 m (5' 5\")   Wt 111.8 kg (246 lb 8 oz)   SpO2 97%   BMI 41.02 kg/m    Body mass index is 41.02 kg/m .  Physical Exam   {Exam List (Optional):078722}    {Diagnostic Test Results (Optional):648332}        Signed Electronically by: JT Trivedi CNP  {Email feedback regarding this note to [email or web address]   :314601}  " 72 in the past few weeks.    She is not experiencing numbness or burning in feet, excessive thirst, blurry vision, weight changes or redness, sores or blisters on feet. The patient has not had a diabetic eye exam in the last 12 months.          Hyperlipidemia:  She presents for follow up of hyperlipidemia.   She is not taking medication to lower cholesterol. She is not having myalgia or other side effects to statin medications.    Hypertension: She presents for follow up of hypertension.  She does not check blood pressure  regularly outside of the clinic. Outpatient blood pressures have not been over 140/90. She does not follow a low salt diet.     She eats 2-3 servings of fruits and vegetables daily.She consumes 0 sweetened beverage(s) daily.She exercises with enough effort to increase her heart rate 9 or less minutes per day.  She exercises with enough effort to increase her heart rate 3 or less days per week.   She is taking medications regularly.      Nevin is a 65-year-old female here for follow-up of type 2 diabetes mellitus, hypertriglyceridemia, vitamin D deficiency, and obesity.  She was previously taking Bydureon, however, Bydureon is no longer available.  She was then given a prescription for Ozempic, however, the pharmacy was unable to obtain Ozempic, therefore, she recently given a prescription for Mounjaro instead.  She began taking Mounjaro on 5/27/2025.  No adverse side effects from taking Mounjaro.  She would like to have a prescription for a Dexcom monitor.  She is currently taking lisinopril to protect her kidneys due to her diabetes mellitus type 2.  She does not have a diagnosis of hypertension.  She denies any recent shortness of breath or chest pain.  No recent lower extremity edema.      She has a diagnosis of hypertriglyceridemia.  She does not wish to take a statin.  She has a prescription for supplemental vitamin D for treatment of vitamin D insufficiency, however, the prescription ran out  and needs to be refilled.  She has a diagnosis of class III severe obesity.  She has made diet changes and has been eating less carbs and less bread.  She is also hoping that Mounjaro will help her lose weight as well.  She takes supplemental vitamin B12.  She otherwise feels that she is currently doing well.    Diabetes Follow-up    How often are you checking your blood sugar? Not at all  What concerns do you have today about your diabetes? Low blood sugar   Do you have any of these symptoms? (Select all that apply)  No numbness or tingling in feet.  No redness, sores or blisters on feet.  No complaints of excessive thirst.  No reports of blurry vision.  No significant changes to weight.  Have you had a diabetic eye exam in the last 12 months? No            Hyperlipidemia Follow-Up    Are you regularly taking any medication or supplement to lower your cholesterol?   No  Are you having muscle aches or other side effects that you think could be caused by your cholesterol lowering medication?  No      Hemoglobin A1C (%)   Date Value   06/24/2015 5.9   06/25/2014 6.1     LDL Cholesterol Calculated (mg/dL)   Date Value   06/24/2015 74   12/24/2013 90       Allergies   Allergen Reactions    Methimazole Itching    Aleve [Naproxen] Headache    Imitrex [Sumatriptan] Palpitations    Codeine Other (See Comments) and Itching     SOB    Morphine Other (See Comments)     Low heart rate    Morphine And Codeine     Penicillins Other (See Comments) and Rash     Swelling         Current Outpatient Medications   Medication Sig Dispense Refill    albuterol (PROAIR HFA/PROVENTIL HFA/VENTOLIN HFA) 108 (90 Base) MCG/ACT inhaler Inhale 2 puffs into the lungs every 6 hours as needed for shortness of breath / dyspnea or wheezing 8.5 g 0    aspirin 81 MG EC tablet Take 81 mg by mouth daily.      cetirizine (ZYRTEC) 10 MG tablet Take 10 mg by mouth daily.      clindamycin (CLEOCIN T) 1 % external lotion       Continuous Glucose Sensor (DEXCOM  G7 SENSOR) MISC 1 each every 10 days. Change sensor every 10 days 9 each 5    cyanocobalamin (VITAMIN B-12) 1000 MCG tablet Take 1 tablet (1,000 mcg) by mouth daily. 90 tablet 3    DULoxetine (CYMBALTA) 60 MG capsule Take 60 mg by mouth daily      hydroxychloroquine (PLAQUENIL) 200 MG tablet Take 200 mg by mouth 2 times daily      ipratropium - albuterol 0.5 mg/2.5 mg/3 mL (DUONEB) 0.5-2.5 (3) MG/3ML neb solution Inhale 3 mLs into the lungs.      lisinopril (PRINIVIL,ZESTRIL) 2.5 MG tablet Take 1 tablet (2.5 mg) by mouth daily 30 tablet 12    permethrin (ELIMITE) 5 % external cream       PREDNISONE PO Take 5 mg by mouth daily      PseudoePHEDrine-guaiFENesin 120-1200 MG TB12 Take 1 tablet by mouth daily as needed.      RINVOQ 15 MG tablet Take 15 mg by mouth daily.      tirzepatide (MOUNJARO) 2.5 MG/0.5ML SOAJ auto-injector pen Inject 0.5 mLs (2.5 mg) subcutaneously once a week. 2 mL 0    vitamin D2 (ERGOCALCIFEROL) 95067 units (1250 mcg) capsule Take 1 capsule (50,000 Units) by mouth once a week. 12 capsule 3    cholecalciferol (VITAMIN D3) 1250 mcg (25230 units) capsule Take 1,250 mcg by mouth every 7 days.      ubrogepant (UBRELVY) 50 MG tablet Take 50 mg by mouth.       Current Facility-Administered Medications   Medication Dose Route Frequency Provider Last Rate Last Admin    strong iodine (LUGOL'S) 5 % external solution   Topical Once Aristeo Sadler MD              Patient Active Problem List   Diagnosis    Hyperlipidemia LDL goal <100    Age-related incipient cataract of both eyes    Allergic rhinitis    CHRIS positive    Roberta-Walker grade 1 cystocele    Bulging of lumbar intervertebral disc    DJD (degenerative joint disease), lumbar    Bursitis of right knee    Chronic bilateral low back pain with bilateral sciatica    Cigarette smoker    CRP elevated    Dry eye syndrome of both eyes    Fatigue    Hypertriglyceridemia without hypercholesterolemia    LGSIL on Pap smear of cervix    Lumbar spinal stenosis     Lung nodules    Migraine headache    Mixed stress and urge urinary incontinence    Multinodular thyroid    Myopia of both eyes with astigmatism and presbyopia    Obstructive sleep apnea of adult    Pain in joint, shoulder region    Pes planus    PVCs (premature ventricular contractions)    Rheumatoid arthritis without rheumatoid factor, multiple sites (H)    Sinusitis chronic, frontal    Situational mixed anxiety and depressive disorder    Subclinical hyperthyroidism    Vitamin D insufficiency    Type 2 diabetes mellitus without complication, without long-term current use of insulin (H)    Statin declined    Class 3 severe obesity due to excess calories with serious comorbidity and body mass index (BMI) of 40.0 to 44.9 in adult (H)          Past Medical History:   Diagnosis Date    Absence of menstruation 01/15/2001    Acute sinusitis, unspecified 01/03/2004    Acute upper respiratory infections of unspecified site 11/17/2000    Age-related incipient cataract of both eyes 10/02/2017    Allergic rhinitis 10/02/2017    CHRIS positive 12/15/2016    Java-Walker grade 1 cystocele 05/23/2025    Bronchitis, not specified as acute or chronic 11/20/2000    Bronchospasm 11/23/2021    Bulging of lumbar intervertebral disc 05/23/2025    Bulging of lumbar intervertebral disc     L4-L5    Bursitis of right knee 05/23/2025    Care involving other physical therapy 04/17/2000    Chest pain, unspecified 10/12/2000    Chronic bilateral low back pain with bilateral sciatica 05/23/2025    Cigarette smoker 05/23/2025    Class 3 severe obesity due to excess calories with serious comorbidity and body mass index (BMI) of 40.0 to 44.9 in adult (H)     CRP elevated 08/27/2018    DJD (degenerative joint disease), lumbar 05/23/2025    Dry eye syndrome of both eyes 05/23/2025    Fatigue 12/07/2017    History of back injury 1977    Past history of low back injury at age 17 years requiring treatment with traction    History of paralysis 1992     1992~Past history of bilateral lower extremity paralysis for 17 months due to L3-L4 disc compression    Hyperlipidemia LDL goal <100 2013    Hypertriglyceridemia without hypercholesterolemia 2025    LGSIL on Pap smear of cervix 2025    Lumbar spinal stenosis 2025    Lung nodules 2025    Migraine, unspecified, without mention of intractable migraine without mention of status migrainosus 2002    Mixed stress and urge urinary incontinence 2025    Mixed stress and urge urinary incontinence     Multinodular thyroid 2025    Myopia of both eyes with astigmatism and presbyopia 2025    Obstructive sleep apnea of adult 2018    Pain in joint, shoulder region 2008    IMO Update 10/11      Pes planus 2017    Postherpetic neuralgia 2023    PVCs (premature ventricular contractions) 2018    Rheumatoid arthritis without rheumatoid factor, multiple sites (H) 2025    Seronegative rheumatoid arthritis (H) 2016    Dr Morro Andersen 2023    Sinusitis chronic, frontal 2017    Situational mixed anxiety and depressive disorder 2025    Solitary nodule of right lobe of thyroid 2025    Statin declined     Subclinical hyperthyroidism 2025    Type 2 diabetes mellitus without complication, without long-term current use of insulin (H) 2025    Unspecified sinusitis (chronic) 2000    Vitamin D insufficiency 2025          Past Surgical History:   Procedure Laterality Date    Carpal tunnel syndrome      carpal tunnel release; RT    cholecystectomy      L3-L4 discectomy            Family History   Problem Relation Age of Onset    C.A.D. Father     Other - See Comments Brother         Hyperlipidemia    Other - See Comments Mother         Hyperlipidemia/Lupus erythematosus/Osteoarthritis, with lumbar stenosis          Family Status   Relation Name Status    Fa      Mo  Alive    Bro  (Not Specified)    No partnership data on file          Social History     Socioeconomic History    Marital status: Single     Spouse name: Not on file    Number of children: Not on file    Years of education: Not on file    Highest education level: Not on file   Occupational History    Not on file   Tobacco Use    Smoking status: Former     Current packs/day: 1.00     Average packs/day: 1 pack/day for 35.4 years (35.4 ttl pk-yrs)     Types: Cigarettes     Start date: 10/26/1985     Quit date: 10/26/2015    Smokeless tobacco: Never    Tobacco comments:     Tried to quit; longest tobacco free, 3 years; no passive exposure   Vaping Use    Vaping status: Never Used   Substance and Sexual Activity    Alcohol use: No    Drug use: No    Sexual activity: Not on file   Other Topics Concern     Service Not Asked    Blood Transfusions Not Asked    Caffeine Concern Yes     Comment: Coffee, 4 cups daily    Occupational Exposure Not Asked    Hobby Hazards Not Asked    Sleep Concern Yes     Comment: decrease in sleep due to pain    Stress Concern Yes     Comment: due to the pain and not knowing what is wrong    Weight Concern Not Asked    Special Diet Not Asked    Back Care Not Asked    Exercise Not Asked    Bike Helmet Not Asked    Seat Belt Not Asked    Self-Exams Not Asked    Parent/sibling w/ CABG, MI or angioplasty before 65F 55M? Yes     Comment: Dad had heart attack before age 55.   Social History Narrative    Not on file     Social Drivers of Health     Financial Resource Strain: Not on file   Food Insecurity: Not on file   Transportation Needs: Not on file   Physical Activity: Not on file   Stress: Not on file   Social Connections: Not on file   Interpersonal Safety: Low Risk  (6/2/2025)    Interpersonal Safety     Do you feel physically and emotionally safe where you currently live?: Yes     Within the past 12 months, have you been hit, slapped, kicked or otherwise physically hurt by someone?: No     Within the past 12 months,  "have you been humiliated or emotionally abused in other ways by your partner or ex-partner?: No   Housing Stability: Not on file               Objective    /82 (BP Location: Left arm, Patient Position: Sitting, Cuff Size: Adult Large)   Pulse 86   Temp 97.3  F (36.3  C) (Tympanic)   Resp 18   Ht 1.651 m (5' 5\")   Wt 111.8 kg (246 lb 8 oz)   SpO2 97%   BMI 41.02 kg/m    Body mass index is 41.02 kg/m .  Physical Exam   General: This is an adequately hydrated 65-year-old female who appears in no acute distress.  She is oriented x 3.  Lungs: Are clear bilaterally. Respirations regular and unlabored.  Cardiovascular: Apical pulse regular. S1 and S2 heard without splitting. No murmur. No audible S3 or S4. No edema.  Psych: Alert and oriented in all spheres. Short and long-term memory intact. Mood and affect are appropriate. Speech content is appropriate.        Results for orders placed or performed in visit on 06/02/25   Hemoglobin A1c     Status: Abnormal   Result Value Ref Range    Estimated Average Glucose 117 (H) <117 mg/dL    Hemoglobin A1C 5.7 (H) <5.7 %   Basic metabolic panel     Status: Abnormal   Result Value Ref Range    Sodium 142 135 - 145 mmol/L    Potassium 5.1 3.4 - 5.3 mmol/L    Chloride 104 98 - 107 mmol/L    Carbon Dioxide (CO2) 26 22 - 29 mmol/L    Anion Gap 12 7 - 15 mmol/L    Urea Nitrogen 13.1 8.0 - 23.0 mg/dL    Creatinine 0.99 (H) 0.51 - 0.95 mg/dL    GFR Estimate 64 >60 mL/min/1.73m2    Calcium 9.3 8.8 - 10.4 mg/dL    Glucose 90 70 - 99 mg/dL    Patient Fasting > 8hrs? Yes    UA Macroscopic with reflex to Microscopic and Culture     Status: Normal    Specimen: Urine, Midstream   Result Value Ref Range    Color Urine Straw Colorless, Straw, Light Yellow, Yellow    Appearance Urine Clear Clear    Glucose Urine Negative Negative mg/dL    Bilirubin Urine Negative Negative    Ketones Urine Negative Negative mg/dL    Specific Gravity Urine 1.004 1.003 - 1.035    Blood Urine Negative " Negative    pH Urine 6.5 4.7 - 8.0    Protein Albumin Urine Negative Negative mg/dL    Urobilinogen Urine Normal Normal mg/dL    Nitrite Urine Negative Negative    Leukocyte Esterase Urine Negative Negative    RBC Urine <1 <=2 /HPF    WBC Urine 0 <=5 /HPF    Squamous Epithelials Urine 0 <=1 /HPF    Narrative    Microscopic not indicated  Urine Culture not indicated   Albumin Random Urine Quantitative with Creat Ratio     Status: None   Result Value Ref Range    Creatinine Urine mg/dL 27.0 mg/dL    Albumin Urine mg/L <12.0 mg/L    Albumin Urine mg/g Cr     Lipid Profile     Status: None   Result Value Ref Range    Cholesterol 159 <200 mg/dL    Triglycerides 111 <150 mg/dL    Direct Measure HDL 57 >=50 mg/dL    LDL Cholesterol Calculated 80 <100 mg/dL    Non HDL Cholesterol 102 <130 mg/dL    Patient Fasting > 8hrs? Yes     Narrative    Cholesterol  Desirable: < 200 mg/dL  Borderline High: 200 - 239 mg/dL  High: >= 240 mg/dL    Triglycerides  Normal: < 150 mg/dL  Borderline High: 150 - 199 mg/dL  High: 200-499 mg/dL  Very High: >= 500 mg/dL    Direct Measure HDL  Female: >= 50 mg/dL   Male: >= 40 mg/dL    LDL Cholesterol  Desirable: < 100 mg/dL  Above Desirable: 100 - 129 mg/dL   Borderline High: 130 - 159 mg/dL   High:  160 - 189 mg/dL   Very High: >= 190 mg/dL    Non HDL Cholesterol  Desirable: < 130 mg/dL  Above Desirable: 130 - 159 mg/dL  Borderline High: 160 - 189 mg/dL  High: 190 - 219 mg/dL  Very High: >= 220 mg/dL           Signed Electronically by: JT Trivedi CNP

## 2025-06-06 RX ORDER — CHOLECALCIFEROL (VITAMIN D3) 1250 MCG
1250 CAPSULE ORAL
COMMUNITY

## 2025-06-07 DIAGNOSIS — E11.9 TYPE 2 DIABETES MELLITUS WITHOUT COMPLICATION, WITHOUT LONG-TERM CURRENT USE OF INSULIN (H): Primary | ICD-10-CM

## 2025-06-07 DIAGNOSIS — E55.9 VITAMIN D INSUFFICIENCY: ICD-10-CM

## 2025-06-07 RX ORDER — ACYCLOVIR 400 MG/1
1 TABLET ORAL CONTINUOUS
COMMUNITY
Start: 2025-06-02

## 2025-06-17 DIAGNOSIS — E11.9 TYPE 2 DIABETES, HBA1C GOAL < 8% (H): ICD-10-CM

## 2025-06-17 NOTE — TELEPHONE ENCOUNTER
MOUNJARO 2.5 MG/0.5ML SOAJ auto-injector pen       Last Written Prescription Date:  5/20/2025  Last Fill Quantity: 2ml,   # refills: 0  Last Office Visit: 6/2/2025  Future Office visit:    Next 5 appointments (look out 90 days)      Aug 11, 2025 8:00 AM  (Arrive by 7:45 AM)  Provider Visit with JT Pan CNP  [facility] ([facility] [place] ) [address]  [place] [address]  [phone]     Sep 04, 2025 8:30 AM  (Arrive by 8:15 AM)  Provider Visit with JT Pan CNP  [facility] ([facility] [place] ) [address]  [place] [address]  [phone]

## 2025-06-19 RX ORDER — TIRZEPATIDE 2.5 MG/.5ML
INJECTION, SOLUTION SUBCUTANEOUS
Qty: 2 ML | Refills: 0 | Status: SHIPPED | OUTPATIENT
Start: 2025-06-19

## 2025-07-07 DIAGNOSIS — F43.23 SITUATIONAL MIXED ANXIETY AND DEPRESSIVE DISORDER: Primary | ICD-10-CM

## 2025-07-08 RX ORDER — DULOXETIN HYDROCHLORIDE 60 MG/1
60 CAPSULE, DELAYED RELEASE ORAL DAILY
Qty: 90 CAPSULE | Refills: 0 | Status: SHIPPED | OUTPATIENT
Start: 2025-07-08

## 2025-07-08 NOTE — TELEPHONE ENCOUNTER
Cymbalta      Last Written Prescription Date:  4.8.25  Last Fill Quantity: #90,   # refills: 0  Last Office Visit: 6.2.25  Future Office visit:    Next 5 appointments (look out 90 days)      Aug 11, 2025 8:00 AM  (Arrive by 7:45 AM)  Provider Visit with JT Pan CNP  [facility] ([facility] [place] ) [address]  [place] [address]  [phone]     Sep 04, 2025 8:30 AM  (Arrive by 8:15 AM)  Provider Visit with JT Pan CNP  [facility] [place] ([facility] [place] ) [address]  [place] [address]  [phone]             Routing refill request to provider for review/approval because:

## 2025-07-16 DIAGNOSIS — E11.9 TYPE 2 DIABETES, HBA1C GOAL < 8% (H): ICD-10-CM

## 2025-07-16 NOTE — TELEPHONE ENCOUNTER
Wanting to dosage increased to 7.5   Mounjaro       Last Written Prescription Date:  06/20/2025  Last Fill Quantity: 2,   # refills: 0  Last Office Visit: 06/02/2025  Future Office visit:    Next 5 appointments (look out 90 days)      Aug 11, 2025 8:00 AM  (Arrive by 7:45 AM)  Provider Visit with JT Pan CNP  [facility] [place] ([facility] [place] ) [address]  [place] [address]  [phone]     Sep 04, 2025 8:30 AM  (Arrive by 8:15 AM)  Provider Visit with JT Pan CNP  [facility] [place] ([facility] [place] ) [address]  [place] [address]  [phone]

## 2025-07-16 NOTE — TELEPHONE ENCOUNTER
Reason for call:  Medication      Have you contacted your pharmacy? Yes   If patient has contacted Pharmacy and it has been over 72hrs, continue to #2  Medication Mounjaro PATIENT WANTS TO INCREASE DOSAGE TO 7.5 MG/INJECTION  [place] you [place] [facility]      (Please note that the turn-around-time for prescriptions is 72 business hours; I am sending your request at this time. SEND TO appropriate Care Team Pool )

## 2025-08-11 ENCOUNTER — LAB (OUTPATIENT)
Dept: LAB | Facility: OTHER | Age: 65
End: 2025-08-11
Attending: NURSE PRACTITIONER
Payer: MEDICARE

## 2025-08-11 ENCOUNTER — OFFICE VISIT (OUTPATIENT)
Dept: FAMILY MEDICINE | Facility: OTHER | Age: 65
End: 2025-08-11
Attending: NURSE PRACTITIONER
Payer: MEDICARE

## 2025-08-11 ENCOUNTER — ANCILLARY PROCEDURE (OUTPATIENT)
Dept: GENERAL RADIOLOGY | Facility: OTHER | Age: 65
End: 2025-08-11
Attending: NURSE PRACTITIONER
Payer: MEDICARE

## 2025-08-11 VITALS
DIASTOLIC BLOOD PRESSURE: 70 MMHG | WEIGHT: 222.7 LBS | HEART RATE: 80 BPM | SYSTOLIC BLOOD PRESSURE: 118 MMHG | BODY MASS INDEX: 37.1 KG/M2 | HEIGHT: 65 IN | RESPIRATION RATE: 18 BRPM | TEMPERATURE: 97.6 F | OXYGEN SATURATION: 97 %

## 2025-08-11 DIAGNOSIS — E05.90 SUBCLINICAL HYPERTHYROIDISM: ICD-10-CM

## 2025-08-11 DIAGNOSIS — M25.561 ACUTE PAIN OF RIGHT KNEE: ICD-10-CM

## 2025-08-11 DIAGNOSIS — F17.210 CIGARETTE SMOKER: ICD-10-CM

## 2025-08-11 DIAGNOSIS — E66.01 CLASS 2 SEVERE OBESITY DUE TO EXCESS CALORIES WITH SERIOUS COMORBIDITY AND BODY MASS INDEX (BMI) OF 37.0 TO 37.9 IN ADULT (H): ICD-10-CM

## 2025-08-11 DIAGNOSIS — M23.51 RECURRENT RIGHT KNEE INSTABILITY: ICD-10-CM

## 2025-08-11 DIAGNOSIS — E55.9 VITAMIN D INSUFFICIENCY: ICD-10-CM

## 2025-08-11 DIAGNOSIS — E11.9 TYPE 2 DIABETES MELLITUS WITHOUT COMPLICATION, WITHOUT LONG-TERM CURRENT USE OF INSULIN (H): ICD-10-CM

## 2025-08-11 DIAGNOSIS — E11.9 TYPE 2 DIABETES MELLITUS WITHOUT COMPLICATION, WITHOUT LONG-TERM CURRENT USE OF INSULIN (H): Primary | ICD-10-CM

## 2025-08-11 DIAGNOSIS — E66.812 CLASS 2 SEVERE OBESITY DUE TO EXCESS CALORIES WITH SERIOUS COMORBIDITY AND BODY MASS INDEX (BMI) OF 37.0 TO 37.9 IN ADULT (H): ICD-10-CM

## 2025-08-11 LAB
ALBUMIN SERPL BCG-MCNC: 4.2 G/DL (ref 3.5–5.2)
ALP SERPL-CCNC: 42 U/L (ref 40–150)
ALT SERPL W P-5'-P-CCNC: 23 U/L (ref 0–50)
ANION GAP SERPL CALCULATED.3IONS-SCNC: 10 MMOL/L (ref 7–15)
AST SERPL W P-5'-P-CCNC: 27 U/L (ref 0–45)
BASOPHILS # BLD AUTO: 0 10E3/UL (ref 0–0.2)
BASOPHILS NFR BLD AUTO: 1 %
BILIRUB DIRECT SERPL-MCNC: 0.16 MG/DL (ref 0–0.3)
BILIRUB SERPL-MCNC: 0.4 MG/DL
BUN SERPL-MCNC: 14.7 MG/DL (ref 8–23)
CALCIUM SERPL-MCNC: 8.7 MG/DL (ref 8.8–10.4)
CHLORIDE SERPL-SCNC: 104 MMOL/L (ref 98–107)
CREAT SERPL-MCNC: 0.75 MG/DL (ref 0.51–0.95)
EGFRCR SERPLBLD CKD-EPI 2021: 89 ML/MIN/1.73M2
EOSINOPHIL # BLD AUTO: 0.1 10E3/UL (ref 0–0.7)
EOSINOPHIL NFR BLD AUTO: 1 %
ERYTHROCYTE [DISTWIDTH] IN BLOOD BY AUTOMATED COUNT: 13.2 % (ref 10–15)
EST. AVERAGE GLUCOSE BLD GHB EST-MCNC: 111 MG/DL
GLUCOSE SERPL-MCNC: 80 MG/DL (ref 70–99)
HBA1C MFR BLD: 5.5 %
HCO3 SERPL-SCNC: 24 MMOL/L (ref 22–29)
HCT VFR BLD AUTO: 45 % (ref 35–47)
HGB BLD-MCNC: 15.5 G/DL (ref 11.7–15.7)
IMM GRANULOCYTES # BLD: 0 10E3/UL
IMM GRANULOCYTES NFR BLD: 0 %
LYMPHOCYTES # BLD AUTO: 1 10E3/UL (ref 0.8–5.3)
LYMPHOCYTES NFR BLD AUTO: 14 %
MCH RBC QN AUTO: 30.8 PG (ref 26.5–33)
MCHC RBC AUTO-ENTMCNC: 34.4 G/DL (ref 31.5–36.5)
MCV RBC AUTO: 89 FL (ref 78–100)
MONOCYTES # BLD AUTO: 0.7 10E3/UL (ref 0–1.3)
MONOCYTES NFR BLD AUTO: 10 %
NEUTROPHILS # BLD AUTO: 5.5 10E3/UL (ref 1.6–8.3)
NEUTROPHILS NFR BLD AUTO: 75 %
NRBC # BLD AUTO: 0 10E3/UL
NRBC BLD AUTO-RTO: 0 /100
PLATELET # BLD AUTO: 373 10E3/UL (ref 150–450)
POTASSIUM SERPL-SCNC: 4.2 MMOL/L (ref 3.4–5.3)
PROT SERPL-MCNC: 6.8 G/DL (ref 6.4–8.3)
RBC # BLD AUTO: 5.04 10E6/UL (ref 3.8–5.2)
SODIUM SERPL-SCNC: 138 MMOL/L (ref 135–145)
TSH SERPL DL<=0.005 MIU/L-ACNC: 1.17 UIU/ML (ref 0.3–4.2)
VIT D+METAB SERPL-MCNC: 41 NG/ML (ref 20–50)
WBC # BLD AUTO: 7.4 10E3/UL (ref 4–11)

## 2025-08-11 PROCEDURE — 73562 X-RAY EXAM OF KNEE 3: CPT | Mod: TC,RT

## 2025-08-11 PROCEDURE — G0463 HOSPITAL OUTPT CLINIC VISIT: HCPCS

## 2025-08-11 PROCEDURE — 85025 COMPLETE CBC W/AUTO DIFF WBC: CPT | Mod: ZL

## 2025-08-11 PROCEDURE — 73562 X-RAY EXAM OF KNEE 3: CPT | Mod: 26 | Performed by: RADIOLOGY

## 2025-08-11 PROCEDURE — 80053 COMPREHEN METABOLIC PANEL: CPT | Mod: ZL

## 2025-08-11 PROCEDURE — 82306 VITAMIN D 25 HYDROXY: CPT | Mod: ZL

## 2025-08-11 PROCEDURE — 36415 COLL VENOUS BLD VENIPUNCTURE: CPT | Mod: ZL

## 2025-08-11 PROCEDURE — 82248 BILIRUBIN DIRECT: CPT | Mod: ZL

## 2025-08-11 PROCEDURE — 83036 HEMOGLOBIN GLYCOSYLATED A1C: CPT | Mod: ZL

## 2025-08-11 PROCEDURE — 3044F HG A1C LEVEL LT 7.0%: CPT

## 2025-08-11 PROCEDURE — 84443 ASSAY THYROID STIM HORMONE: CPT | Mod: ZL

## 2025-08-11 RX ORDER — PREDNISONE 1 MG/1
4 TABLET ORAL EVERY OTHER DAY
COMMUNITY
Start: 2025-07-02

## 2025-08-11 ASSESSMENT — PAIN SCALES - GENERAL: PAINLEVEL_OUTOF10: MILD PAIN (3)

## 2025-08-14 DIAGNOSIS — E78.1 HYPERTRIGLYCERIDEMIA WITHOUT HYPERCHOLESTEROLEMIA: ICD-10-CM

## 2025-08-14 DIAGNOSIS — E11.9 TYPE 2 DIABETES MELLITUS WITHOUT COMPLICATION, WITHOUT LONG-TERM CURRENT USE OF INSULIN (H): Primary | ICD-10-CM

## 2025-08-29 ENCOUNTER — HOSPITAL ENCOUNTER (OUTPATIENT)
Dept: MRI IMAGING | Facility: HOSPITAL | Age: 65
Discharge: HOME | End: 2025-08-29
Attending: NURSE PRACTITIONER
Payer: MEDICARE

## 2025-08-29 ENCOUNTER — HOSPITAL ENCOUNTER (OUTPATIENT)
Dept: CT IMAGING | Facility: HOSPITAL | Age: 65
Discharge: HOME | End: 2025-08-29
Attending: NURSE PRACTITIONER
Payer: MEDICARE

## 2025-08-29 DIAGNOSIS — F17.210 CIGARETTE SMOKER: ICD-10-CM

## 2025-08-29 DIAGNOSIS — M25.561 ACUTE PAIN OF RIGHT KNEE: ICD-10-CM

## 2025-08-29 DIAGNOSIS — M23.51 RECURRENT RIGHT KNEE INSTABILITY: ICD-10-CM

## 2025-08-29 PROCEDURE — 73721 MRI JNT OF LWR EXTRE W/O DYE: CPT | Mod: 26 | Performed by: RADIOLOGY

## 2025-08-29 PROCEDURE — 73721 MRI JNT OF LWR EXTRE W/O DYE: CPT | Mod: RT

## 2025-08-29 PROCEDURE — 71271 CT THORAX LUNG CANCER SCR C-: CPT | Mod: 26 | Performed by: RADIOLOGY

## 2025-08-29 PROCEDURE — 71271 CT THORAX LUNG CANCER SCR C-: CPT

## 2025-09-02 DIAGNOSIS — M23.51 RECURRENT RIGHT KNEE INSTABILITY: ICD-10-CM

## 2025-09-02 DIAGNOSIS — M25.561 ACUTE PAIN OF RIGHT KNEE: Primary | ICD-10-CM
